# Patient Record
Sex: FEMALE | Race: OTHER | NOT HISPANIC OR LATINO | URBAN - METROPOLITAN AREA
[De-identification: names, ages, dates, MRNs, and addresses within clinical notes are randomized per-mention and may not be internally consistent; named-entity substitution may affect disease eponyms.]

---

## 2019-08-28 ENCOUNTER — INPATIENT (INPATIENT)
Facility: HOSPITAL | Age: 34
LOS: 1 days | Discharge: HOME | DRG: 440 | End: 2019-08-30
Attending: STUDENT IN AN ORGANIZED HEALTH CARE EDUCATION/TRAINING PROGRAM | Admitting: STUDENT IN AN ORGANIZED HEALTH CARE EDUCATION/TRAINING PROGRAM
Payer: SELF-PAY

## 2019-08-28 VITALS
OXYGEN SATURATION: 97 % | HEART RATE: 85 BPM | RESPIRATION RATE: 17 BRPM | TEMPERATURE: 98 F | SYSTOLIC BLOOD PRESSURE: 133 MMHG | DIASTOLIC BLOOD PRESSURE: 89 MMHG | WEIGHT: 134.92 LBS

## 2019-08-28 DIAGNOSIS — Z90.49 ACQUIRED ABSENCE OF OTHER SPECIFIED PARTS OF DIGESTIVE TRACT: Chronic | ICD-10-CM

## 2019-08-28 DIAGNOSIS — Z91.89 OTHER SPECIFIED PERSONAL RISK FACTORS, NOT ELSEWHERE CLASSIFIED: ICD-10-CM

## 2019-08-28 DIAGNOSIS — N28.9 DISORDER OF KIDNEY AND URETER, UNSPECIFIED: ICD-10-CM

## 2019-08-28 DIAGNOSIS — K85.90 ACUTE PANCREATITIS WITHOUT NECROSIS OR INFECTION, UNSPECIFIED: ICD-10-CM

## 2019-08-28 DIAGNOSIS — R63.8 OTHER SYMPTOMS AND SIGNS CONCERNING FOOD AND FLUID INTAKE: ICD-10-CM

## 2019-08-28 DIAGNOSIS — D72.829 ELEVATED WHITE BLOOD CELL COUNT, UNSPECIFIED: ICD-10-CM

## 2019-08-28 LAB
ALBUMIN SERPL ELPH-MCNC: 4.5 G/DL — SIGNIFICANT CHANGE UP (ref 3.3–5)
ALP SERPL-CCNC: 65 U/L — SIGNIFICANT CHANGE UP (ref 40–120)
ALT FLD-CCNC: 48 U/L — HIGH (ref 10–45)
ANION GAP SERPL CALC-SCNC: 9 MMOL/L — SIGNIFICANT CHANGE UP (ref 5–17)
APPEARANCE UR: ABNORMAL
AST SERPL-CCNC: 110 U/L — HIGH (ref 10–40)
BASOPHILS # BLD AUTO: 0.04 K/UL — SIGNIFICANT CHANGE UP (ref 0–0.2)
BASOPHILS NFR BLD AUTO: 0.3 % — SIGNIFICANT CHANGE UP (ref 0–2)
BILIRUB SERPL-MCNC: 0.9 MG/DL — SIGNIFICANT CHANGE UP (ref 0.2–1.2)
BILIRUB UR-MCNC: NEGATIVE — SIGNIFICANT CHANGE UP
BUN SERPL-MCNC: 13 MG/DL — SIGNIFICANT CHANGE UP (ref 7–23)
CALCIUM SERPL-MCNC: 8.9 MG/DL — SIGNIFICANT CHANGE UP (ref 8.4–10.5)
CHLORIDE SERPL-SCNC: 106 MMOL/L — SIGNIFICANT CHANGE UP (ref 96–108)
CO2 SERPL-SCNC: 26 MMOL/L — SIGNIFICANT CHANGE UP (ref 22–31)
COLOR SPEC: YELLOW — SIGNIFICANT CHANGE UP
CREAT SERPL-MCNC: 0.74 MG/DL — SIGNIFICANT CHANGE UP (ref 0.5–1.3)
DIFF PNL FLD: NEGATIVE — SIGNIFICANT CHANGE UP
EOSINOPHIL # BLD AUTO: 0.05 K/UL — SIGNIFICANT CHANGE UP (ref 0–0.5)
EOSINOPHIL NFR BLD AUTO: 0.3 % — SIGNIFICANT CHANGE UP (ref 0–6)
GLUCOSE SERPL-MCNC: 110 MG/DL — HIGH (ref 70–99)
GLUCOSE UR QL: NEGATIVE — SIGNIFICANT CHANGE UP
HCG SERPL-ACNC: <0 MIU/ML — SIGNIFICANT CHANGE UP
HCT VFR BLD CALC: 42.4 % — SIGNIFICANT CHANGE UP (ref 34.5–45)
HGB BLD-MCNC: 13.6 G/DL — SIGNIFICANT CHANGE UP (ref 11.5–15.5)
IMM GRANULOCYTES NFR BLD AUTO: 0.3 % — SIGNIFICANT CHANGE UP (ref 0–1.5)
KETONES UR-MCNC: ABNORMAL MG/DL
LACTATE SERPL-SCNC: 0.9 MMOL/L — SIGNIFICANT CHANGE UP (ref 0.5–2)
LEUKOCYTE ESTERASE UR-ACNC: NEGATIVE — SIGNIFICANT CHANGE UP
LIDOCAIN IGE QN: 271 U/L — HIGH (ref 7–60)
LYMPHOCYTES # BLD AUTO: 1.2 K/UL — SIGNIFICANT CHANGE UP (ref 1–3.3)
LYMPHOCYTES # BLD AUTO: 7.6 % — LOW (ref 13–44)
MCHC RBC-ENTMCNC: 29.6 PG — SIGNIFICANT CHANGE UP (ref 27–34)
MCHC RBC-ENTMCNC: 32.1 GM/DL — SIGNIFICANT CHANGE UP (ref 32–36)
MCV RBC AUTO: 92.4 FL — SIGNIFICANT CHANGE UP (ref 80–100)
MONOCYTES # BLD AUTO: 0.87 K/UL — SIGNIFICANT CHANGE UP (ref 0–0.9)
MONOCYTES NFR BLD AUTO: 5.5 % — SIGNIFICANT CHANGE UP (ref 2–14)
NEUTROPHILS # BLD AUTO: 13.65 K/UL — HIGH (ref 1.8–7.4)
NEUTROPHILS NFR BLD AUTO: 86 % — HIGH (ref 43–77)
NITRITE UR-MCNC: NEGATIVE — SIGNIFICANT CHANGE UP
NRBC # BLD: 0 /100 WBCS — SIGNIFICANT CHANGE UP (ref 0–0)
PH UR: 7.5 — SIGNIFICANT CHANGE UP (ref 5–8)
PLATELET # BLD AUTO: 272 K/UL — SIGNIFICANT CHANGE UP (ref 150–400)
POTASSIUM SERPL-MCNC: 4.2 MMOL/L — SIGNIFICANT CHANGE UP (ref 3.5–5.3)
POTASSIUM SERPL-SCNC: 4.2 MMOL/L — SIGNIFICANT CHANGE UP (ref 3.5–5.3)
PROT SERPL-MCNC: 7 G/DL — SIGNIFICANT CHANGE UP (ref 6–8.3)
PROT UR-MCNC: NEGATIVE MG/DL — SIGNIFICANT CHANGE UP
RBC # BLD: 4.59 M/UL — SIGNIFICANT CHANGE UP (ref 3.8–5.2)
RBC # FLD: 11.7 % — SIGNIFICANT CHANGE UP (ref 10.3–14.5)
SODIUM SERPL-SCNC: 141 MMOL/L — SIGNIFICANT CHANGE UP (ref 135–145)
SP GR SPEC: 1.02 — SIGNIFICANT CHANGE UP (ref 1–1.03)
UROBILINOGEN FLD QL: 0.2 E.U./DL — SIGNIFICANT CHANGE UP
WBC # BLD: 15.86 K/UL — HIGH (ref 3.8–10.5)
WBC # FLD AUTO: 15.86 K/UL — HIGH (ref 3.8–10.5)

## 2019-08-28 PROCEDURE — 74019 RADEX ABDOMEN 2 VIEWS: CPT | Mod: 26

## 2019-08-28 PROCEDURE — 76705 ECHO EXAM OF ABDOMEN: CPT | Mod: 26

## 2019-08-28 PROCEDURE — 99222 1ST HOSP IP/OBS MODERATE 55: CPT | Mod: GC

## 2019-08-28 PROCEDURE — 99285 EMERGENCY DEPT VISIT HI MDM: CPT | Mod: 25

## 2019-08-28 PROCEDURE — 74022 RADEX COMPL AQT ABD SERIES: CPT | Mod: 26

## 2019-08-28 PROCEDURE — 74177 CT ABD & PELVIS W/CONTRAST: CPT | Mod: 26

## 2019-08-28 RX ORDER — MORPHINE SULFATE 50 MG/1
4 CAPSULE, EXTENDED RELEASE ORAL ONCE
Refills: 0 | Status: DISCONTINUED | OUTPATIENT
Start: 2019-08-28 | End: 2019-08-28

## 2019-08-28 RX ORDER — SODIUM CHLORIDE 9 MG/ML
1000 INJECTION INTRAMUSCULAR; INTRAVENOUS; SUBCUTANEOUS ONCE
Refills: 0 | Status: COMPLETED | OUTPATIENT
Start: 2019-08-28 | End: 2019-08-28

## 2019-08-28 RX ORDER — SODIUM CHLORIDE 9 MG/ML
1000 INJECTION INTRAMUSCULAR; INTRAVENOUS; SUBCUTANEOUS
Refills: 0 | Status: DISCONTINUED | OUTPATIENT
Start: 2019-08-28 | End: 2019-08-28

## 2019-08-28 RX ORDER — CEFTRIAXONE 500 MG/1
1000 INJECTION, POWDER, FOR SOLUTION INTRAMUSCULAR; INTRAVENOUS ONCE
Refills: 0 | Status: COMPLETED | OUTPATIENT
Start: 2019-08-28 | End: 2019-08-28

## 2019-08-28 RX ORDER — OXYCODONE HYDROCHLORIDE 5 MG/1
5 TABLET ORAL EVERY 6 HOURS
Refills: 0 | Status: DISCONTINUED | OUTPATIENT
Start: 2019-08-28 | End: 2019-08-29

## 2019-08-28 RX ORDER — IOHEXOL 300 MG/ML
30 INJECTION, SOLUTION INTRAVENOUS ONCE
Refills: 0 | Status: COMPLETED | OUTPATIENT
Start: 2019-08-28 | End: 2019-08-28

## 2019-08-28 RX ORDER — HYDROMORPHONE HYDROCHLORIDE 2 MG/ML
0.5 INJECTION INTRAMUSCULAR; INTRAVENOUS; SUBCUTANEOUS EVERY 4 HOURS
Refills: 0 | Status: DISCONTINUED | OUTPATIENT
Start: 2019-08-28 | End: 2019-08-30

## 2019-08-28 RX ORDER — SODIUM CHLORIDE 9 MG/ML
1000 INJECTION, SOLUTION INTRAVENOUS
Refills: 0 | Status: DISCONTINUED | OUTPATIENT
Start: 2019-08-28 | End: 2019-08-30

## 2019-08-28 RX ORDER — METRONIDAZOLE 500 MG
500 TABLET ORAL ONCE
Refills: 0 | Status: COMPLETED | OUTPATIENT
Start: 2019-08-28 | End: 2019-08-28

## 2019-08-28 RX ORDER — CYCLOBENZAPRINE HYDROCHLORIDE 10 MG/1
10 TABLET, FILM COATED ORAL ONCE
Refills: 0 | Status: DISCONTINUED | OUTPATIENT
Start: 2019-08-28 | End: 2019-08-28

## 2019-08-28 RX ORDER — KETOROLAC TROMETHAMINE 30 MG/ML
30 SYRINGE (ML) INJECTION ONCE
Refills: 0 | Status: DISCONTINUED | OUTPATIENT
Start: 2019-08-28 | End: 2019-08-28

## 2019-08-28 RX ADMIN — SODIUM CHLORIDE 150 MILLILITER(S): 9 INJECTION INTRAMUSCULAR; INTRAVENOUS; SUBCUTANEOUS at 20:20

## 2019-08-28 RX ADMIN — Medication 100 MILLIGRAM(S): at 20:19

## 2019-08-28 RX ADMIN — MORPHINE SULFATE 4 MILLIGRAM(S): 50 CAPSULE, EXTENDED RELEASE ORAL at 18:07

## 2019-08-28 RX ADMIN — MORPHINE SULFATE 4 MILLIGRAM(S): 50 CAPSULE, EXTENDED RELEASE ORAL at 21:40

## 2019-08-28 RX ADMIN — IOHEXOL 30 MILLILITER(S): 300 INJECTION, SOLUTION INTRAVENOUS at 17:49

## 2019-08-28 RX ADMIN — SODIUM CHLORIDE 200 MILLILITER(S): 9 INJECTION, SOLUTION INTRAVENOUS at 23:54

## 2019-08-28 RX ADMIN — MORPHINE SULFATE 4 MILLIGRAM(S): 50 CAPSULE, EXTENDED RELEASE ORAL at 20:38

## 2019-08-28 RX ADMIN — SODIUM CHLORIDE 150 MILLILITER(S): 9 INJECTION INTRAMUSCULAR; INTRAVENOUS; SUBCUTANEOUS at 23:33

## 2019-08-28 RX ADMIN — MORPHINE SULFATE 4 MILLIGRAM(S): 50 CAPSULE, EXTENDED RELEASE ORAL at 17:49

## 2019-08-28 RX ADMIN — HYDROMORPHONE HYDROCHLORIDE 0.5 MILLIGRAM(S): 2 INJECTION INTRAMUSCULAR; INTRAVENOUS; SUBCUTANEOUS at 23:53

## 2019-08-28 RX ADMIN — CEFTRIAXONE 100 MILLIGRAM(S): 500 INJECTION, POWDER, FOR SOLUTION INTRAMUSCULAR; INTRAVENOUS at 19:26

## 2019-08-28 RX ADMIN — SODIUM CHLORIDE 1000 MILLILITER(S): 9 INJECTION INTRAMUSCULAR; INTRAVENOUS; SUBCUTANEOUS at 17:49

## 2019-08-28 RX ADMIN — HYDROMORPHONE HYDROCHLORIDE 0.5 MILLIGRAM(S): 2 INJECTION INTRAMUSCULAR; INTRAVENOUS; SUBCUTANEOUS at 23:32

## 2019-08-28 RX ADMIN — OXYCODONE HYDROCHLORIDE 5 MILLIGRAM(S): 5 TABLET ORAL at 23:33

## 2019-08-28 NOTE — ED ADULT TRIAGE NOTE - CHIEF COMPLAINT QUOTE
c/o abdominal pain radiating to back that started today while at work.  Denies falls / injury, cough, recent travels, n/v/d.  EKG done

## 2019-08-28 NOTE — H&P ADULT - PROBLEM SELECTOR PLAN 3
Pt w/ hx of kidney lesion. She states it was approx 1cm in the past. Now measuring 2cm on CT. May be angiomyolipoma vs. neoplasm; recommending MRI to confirm  - Will f/u w/ outpatient MRI Pt w/ constipation and possible fecal impaction on CT  - Will start miralax qd

## 2019-08-28 NOTE — H&P ADULT - NSHPPHYSICALEXAM_GEN_ALL_CORE
.  VITAL SIGNS:  T(C): 36.8 (08-28-19 @ 20:32), Max: 36.8 (08-28-19 @ 20:32)  T(F): 98.2 (08-28-19 @ 20:32), Max: 98.2 (08-28-19 @ 20:32)  HR: 91 (08-28-19 @ 20:32) (81 - 91)  BP: 116/76 (08-28-19 @ 20:32) (109/72 - 133/89)  BP(mean): --  RR: 16 (08-28-19 @ 20:32) (16 - 17)  SpO2: 97% (08-28-19 @ 20:32) (97% - 100%)  Wt(kg): --    PHYSICAL EXAM:    Constitutional: WDWN resting comfortably in bed; NAD  Head: NC/AT  Eyes: PERRL, EOMI, anicteric sclera  ENT: no nasal discharge; uvula midline, no oropharyngeal erythema or exudates; MMM  Neck: supple; no JVD or thyromegaly  Respiratory: CTA B/L; no W/R/R, no retractions  Cardiac: +S1/S2; RRR; no M/R/G; PMI non-displaced  Gastrointestinal: soft, NT/ND; no rebound or guarding; +BSx4  Genitourinary: normal external genitalia  Back: spine midline, no bony tenderness or step-offs; no CVAT B/L  Extremities: WWP, no clubbing or cyanosis; no peripheral edema. Capillary refill <2 sec  Musculoskeletal: NROM x4; no joint swelling, tenderness or erythema  Vascular: 2+ radial, femoral, DP/PT pulses B/L  Dermatologic: skin warm, dry and intact; no rashes, wounds, or scars  Lymphatic: no submandibular or cervical LAD  Neurologic: AAOx3; CNII-XII grossly intact; no focal deficits  Psychiatric: affect and characteristics of appearance, verbalizations, behaviors are appropriate .  VITAL SIGNS:  T(C): 36.8 (08-28-19 @ 20:32), Max: 36.8 (08-28-19 @ 20:32)  T(F): 98.2 (08-28-19 @ 20:32), Max: 98.2 (08-28-19 @ 20:32)  HR: 91 (08-28-19 @ 20:32) (81 - 91)  BP: 116/76 (08-28-19 @ 20:32) (109/72 - 133/89)  BP(mean): --  RR: 16 (08-28-19 @ 20:32) (16 - 17)  SpO2: 97% (08-28-19 @ 20:32) (97% - 100%)  Wt(kg): --    PHYSICAL EXAM:    Constitutional: WDWN resting comfortably in bed; NAD  Head: NC/AT  Eyes: PERRL, EOMI, anicteric sclera  ENT: no nasal discharge; uvula midline, no oropharyngeal erythema or exudates; MMM  Neck: supple; no JVD or thyromegaly  Respiratory: CTA B/L; no W/R/R, no retractions  Cardiac: +S1/S2; RRR; no M/R/G; PMI non-displaced  Gastrointestinal: +scarring from previous lap all, +stretch marks, soft, mildly distended; tender to deep palpation in midepigastrium, no rebound or guarding; +BSx4  Genitourinary: normal external genitalia  Back: spine midline, no bony tenderness or step-offs; no CVAT B/L  Extremities: WWP, no clubbing or cyanosis; no peripheral edema. Capillary refill <2 sec  Musculoskeletal: NROM x4; no joint swelling, tenderness or erythema  Vascular: 2+ radial, femoral, DP/PT pulses B/L  Dermatologic: skin warm, dry and intact; no rashes, wounds, or scars  Lymphatic: no submandibular or cervical LAD  Neurologic: AAOx3; CNII-XII grossly intact; no focal deficits  Psychiatric: affect and characteristics of appearance, verbalizations, behaviors are appropriate

## 2019-08-28 NOTE — H&P ADULT - PROBLEM SELECTOR PLAN 2
Pt w/ leukocytosis. Possibly in the setting of non-obstructing stone. No other source of infection noted at this time  - C/w ceftriaxone flagyl for now Pt w/ leukocytosis. Likely reactive in the setting of acute inflammation. No other source of infection noted at this time  - Will hold off on abx for now  - If patient becomes febrile or unstable, will c/w abx for concern for cholangitis

## 2019-08-28 NOTE — ED PROVIDER NOTE - CLINICAL SUMMARY MEDICAL DECISION MAKING FREE TEXT BOX
32 y/o f presents c/o mid abd pain radiating to back; afebrile, vss, exam with epigastric tenderness.  Labs with wbc 15.6, elevated AST and ALT, elevated lipase 297.  Pt has pancreatitis by laboratory values, has hx cholecystectomy, suspect retained stone, has no other obvious cause for pancreatitis.  Will get CT a/p and u/s RUQ to eval for dilated cbd, retained stone.  Pt given IV fluid, pain meds, will f/u imaging and admit.

## 2019-08-28 NOTE — ED PROVIDER NOTE - ATTENDING CONTRIBUTION TO CARE
32 yo F with acute pancreatitis -? related to possible sludge vs gallstones- prior lap choly- will admit to medicine- hydration - will need MRCP/ ERCP

## 2019-08-28 NOTE — H&P ADULT - PROBLEM SELECTOR PLAN 4
F: LR at 200cc/hr  E: replete K<4, Mg<2  N: Low fat mechanical soft    VTE Prophylaxis: Lovenox SubQ  C: Full Code  D: RMF F: LR at 200cc/hr  E: replete K<4, Mg<2  N: Low fat mechanical soft    VTE Prophylaxis: IMPROVE 0; SCDs  C: Full Code  D: RMF Pt w/ hx of kidney lesion. She states it was approx 1cm in the past. Now measuring 2cm on CT. May be angiomyolipoma vs. neoplasm; recommending MRI to confirm  - Will f/u w/ outpatient MRI

## 2019-08-28 NOTE — H&P ADULT - PROBLEM SELECTOR PLAN 6
1) PCP Contacted on Admission: (Y/N) --> Name & Phone #: Dr. Dahl in [facility]  2) Date of Contact with PCP:  3) PCP Contacted at Discharge: (Y/N)  4) Summary of Handoff Given to PCP:   5) Post-Discharge Appointment Date and Location:

## 2019-08-28 NOTE — H&P ADULT - NSHPLABSRESULTS_GEN_ALL_CORE
.  LABS:                         13.6   15.86 )-----------( 272      ( 28 Aug 2019 17:04 )             42.4         141  |  106  |  13  ----------------------------<  110<H>  4.2   |  26  |  0.74    Ca    8.9      28 Aug 2019 17:04    TPro  7.0  /  Alb  4.5  /  TBili  0.9  /  DBili  x   /  AST  110<H>  /  ALT  48<H>  /  AlkPhos  65        Urinalysis Basic - ( 28 Aug 2019 17:42 )    Color: Yellow / Appearance: Cloudy / S.020 / pH: x  Gluc: x / Ketone: Trace mg/dL  / Bili: Negative / Urobili: 0.2 E.U./dL   Blood: x / Protein: NEGATIVE mg/dL / Nitrite: NEGATIVE   Leuk Esterase: NEGATIVE / RBC: x / WBC x   Sq Epi: x / Non Sq Epi: x / Bacteria: x            Lactate, Blood: 0.9 mmoL/L ( @ 19:01)      RADIOLOGY, EKG & ADDITIONAL TESTS: Reviewed.

## 2019-08-28 NOTE — ED PROVIDER NOTE - OBJECTIVE STATEMENT
34 y/o f no pmh presents stating having pain in mid upper abdomen which radiates to her back.  Pt stating she is unsure if she has back pain which is causing her abdominal pain or vice versa.  Pt reports having pain in mid back last week, seen at [facility], given anti inflammatory medication and muscle relaxant with minima improvement and discharged.  Pt denies nausea, vomiting, fever, chills, dysuria, CP, SOB, all other ROS negative.

## 2019-08-28 NOTE — H&P ADULT - ASSESSMENT
34 y/o F w/ hx of cholecystectomy presenting w/ complaints of back and abdominal pain for several hours admitted for acute pancreatitis

## 2019-08-28 NOTE — ED ADULT NURSE NOTE - OBJECTIVE STATEMENT
pt states around 1600 today she was at her desk at work when she suddenly developed severe middle back pain that radiated around to the upper abdomen.  pt states abdominal pain was mostly epigastric and LUQ.  pt denies n/v.  pt denies fevers/chills.  pt states pain was severe and associated with diaphoresis. pt reports rare use of alcohol.  States last intake was 1-2 drinks 3 weeks ago.

## 2019-08-28 NOTE — H&P ADULT - PROBLEM SELECTOR PLAN 5
1) PCP Contacted on Admission: (Y/N) --> Name & Phone #: Dr. Dahl in [facility]  2) Date of Contact with PCP:  3) PCP Contacted at Discharge: (Y/N)  4) Summary of Handoff Given to PCP:   5) Post-Discharge Appointment Date and Location: F: LR at 200cc/hr  E: replete K<4, Mg<2  N: Low fat mechanical soft    VTE Prophylaxis: IMPROVE 0; SCDs  C: Full Code  D: RMF

## 2019-08-28 NOTE — H&P ADULT - NSHPSOCIALHISTORY_GEN_ALL_CORE
Works as . Denies smoking, drinks socially (last drink 3 weeks ago; had 2 drinks that night) and denies illicit drug use.

## 2019-08-28 NOTE — ED PROVIDER NOTE - DIAGNOSTIC INTERPRETATION
abd series- no air fluid levels, no pneumoperitoneum, no acute infiltrate in lung fields, no bony abnormalities

## 2019-08-28 NOTE — H&P ADULT - NSICDXFAMILYHX_GEN_ALL_CORE_FT
FAMILY HISTORY:  FH: asthma  FH: breast cancer  FH: diabetes mellitus  FH: HTN (hypertension)  FH: hyperlipidemia

## 2019-08-28 NOTE — H&P ADULT - HISTORY OF PRESENT ILLNESS
34 y/o F w/ hx of cholecystectomy presenting w/ complaints of back and abdominal pain for several hours. The pain is 6/10, midepigastric w/ radiation to the back, better with leaning forward and worse with laying back for prolonged periods of time. Its onset was sudden and a/w chills, diaphoresis, paleness and initial difficulty breathing. She states that the pain began while she was at work. She ate her lunch around noon which was homemade spaghetti and then starting around 4/4:30pm, she suddenly began experiencing the pain when she got up to stretch. It was witnessed by a coworker who called EMS. She was placed on 2L NC and transferred to the ED. The patient states that this pain is similar in nature to the pain she experienced prior to her cholecystectomy. She previously had an unintentional 10 lb weight loss over the past couple of months however she has been gaining some of that weight back. She denies recent alcohol use (last drink was 3 weeks ago when she had 2 cups of mixed liquor beverage), takes no medications or herbal supplements, denies recreational drug use. She has no sick contacts or recent travel. She denies fever, HA, vision changes, current CP/SOB, N/V/D, dysuria, hematuria, LE edema. Her last BM was approximately 2 days ago which is her normal. Of note, patient recently went to [facility] last week for back pain where she was given a shot of toradol and sent home on flexeril for concern for sciatica.     In the ED, vitals T-97.7, HR-85, BP-133/89, RR-17, SpO2-97% on RA. Labs pertinent for WBC 15.86, AST//48, normal bili and lipase 271. CTAP showed minimal biliary ductal dilation, possible rectal fecal impaction, and heterogenous lesion in R kidney (angiomyolipoma vs. neoplasm). US also w/ ductal dilation. Patient received ceftriaxone 1g, flagyl 500mg, morphine 8mg IV, 1L NS and was started on NS at 150cc/hr.

## 2019-08-29 DIAGNOSIS — D72.829 ELEVATED WHITE BLOOD CELL COUNT, UNSPECIFIED: ICD-10-CM

## 2019-08-29 DIAGNOSIS — R94.5 ABNORMAL RESULTS OF LIVER FUNCTION STUDIES: ICD-10-CM

## 2019-08-29 DIAGNOSIS — K85.90 ACUTE PANCREATITIS WITHOUT NECROSIS OR INFECTION, UNSPECIFIED: ICD-10-CM

## 2019-08-29 DIAGNOSIS — K59.00 CONSTIPATION, UNSPECIFIED: ICD-10-CM

## 2019-08-29 LAB
ALBUMIN SERPL ELPH-MCNC: 3.8 G/DL — SIGNIFICANT CHANGE UP (ref 3.3–5)
ALBUMIN SERPL ELPH-MCNC: 3.9 G/DL — SIGNIFICANT CHANGE UP (ref 3.3–5)
ALP SERPL-CCNC: 143 U/L — HIGH (ref 40–120)
ALP SERPL-CCNC: 150 U/L — HIGH (ref 40–120)
ALT FLD-CCNC: 786 U/L — HIGH (ref 10–45)
ALT FLD-CCNC: 988 U/L — HIGH (ref 10–45)
ANION GAP SERPL CALC-SCNC: 7 MMOL/L — SIGNIFICANT CHANGE UP (ref 5–17)
ANION GAP SERPL CALC-SCNC: 9 MMOL/L — SIGNIFICANT CHANGE UP (ref 5–17)
APTT BLD: 30.4 SEC — SIGNIFICANT CHANGE UP (ref 27.5–36.3)
APTT BLD: 31.5 SEC — SIGNIFICANT CHANGE UP (ref 27.5–36.3)
AST SERPL-CCNC: 1370 U/L — HIGH (ref 10–40)
AST SERPL-CCNC: 851 U/L — HIGH (ref 10–40)
BASOPHILS # BLD AUTO: 0.02 K/UL — SIGNIFICANT CHANGE UP (ref 0–0.2)
BASOPHILS NFR BLD AUTO: 0.3 % — SIGNIFICANT CHANGE UP (ref 0–2)
BILIRUB SERPL-MCNC: 2.1 MG/DL — HIGH (ref 0.2–1.2)
BILIRUB SERPL-MCNC: 2.4 MG/DL — HIGH (ref 0.2–1.2)
BLD GP AB SCN SERPL QL: NEGATIVE — SIGNIFICANT CHANGE UP
BUN SERPL-MCNC: 6 MG/DL — LOW (ref 7–23)
BUN SERPL-MCNC: 6 MG/DL — LOW (ref 7–23)
CALCIUM SERPL-MCNC: 8.4 MG/DL — SIGNIFICANT CHANGE UP (ref 8.4–10.5)
CALCIUM SERPL-MCNC: 8.5 MG/DL — SIGNIFICANT CHANGE UP (ref 8.4–10.5)
CHLORIDE SERPL-SCNC: 103 MMOL/L — SIGNIFICANT CHANGE UP (ref 96–108)
CHLORIDE SERPL-SCNC: 106 MMOL/L — SIGNIFICANT CHANGE UP (ref 96–108)
CHOLEST SERPL-MCNC: 166 MG/DL — SIGNIFICANT CHANGE UP (ref 10–199)
CO2 SERPL-SCNC: 22 MMOL/L — SIGNIFICANT CHANGE UP (ref 22–31)
CO2 SERPL-SCNC: 25 MMOL/L — SIGNIFICANT CHANGE UP (ref 22–31)
CREAT SERPL-MCNC: 0.6 MG/DL — SIGNIFICANT CHANGE UP (ref 0.5–1.3)
CREAT SERPL-MCNC: 0.65 MG/DL — SIGNIFICANT CHANGE UP (ref 0.5–1.3)
EOSINOPHIL # BLD AUTO: 0.04 K/UL — SIGNIFICANT CHANGE UP (ref 0–0.5)
EOSINOPHIL NFR BLD AUTO: 0.6 % — SIGNIFICANT CHANGE UP (ref 0–6)
GLUCOSE SERPL-MCNC: 111 MG/DL — HIGH (ref 70–99)
GLUCOSE SERPL-MCNC: 111 MG/DL — HIGH (ref 70–99)
HCT VFR BLD CALC: 36.3 % — SIGNIFICANT CHANGE UP (ref 34.5–45)
HCT VFR BLD CALC: 37 % — SIGNIFICANT CHANGE UP (ref 34.5–45)
HDLC SERPL-MCNC: 45 MG/DL — LOW
HGB BLD-MCNC: 11.9 G/DL — SIGNIFICANT CHANGE UP (ref 11.5–15.5)
HGB BLD-MCNC: 11.9 G/DL — SIGNIFICANT CHANGE UP (ref 11.5–15.5)
IMM GRANULOCYTES NFR BLD AUTO: 0.3 % — SIGNIFICANT CHANGE UP (ref 0–1.5)
INR BLD: 1.1 — SIGNIFICANT CHANGE UP (ref 0.88–1.16)
INR BLD: 1.11 — SIGNIFICANT CHANGE UP (ref 0.88–1.16)
LIPID PNL WITH DIRECT LDL SERPL: 91 MG/DL — SIGNIFICANT CHANGE UP
LYMPHOCYTES # BLD AUTO: 0.68 K/UL — LOW (ref 1–3.3)
LYMPHOCYTES # BLD AUTO: 10.8 % — LOW (ref 13–44)
MAGNESIUM SERPL-MCNC: 1.9 MG/DL — SIGNIFICANT CHANGE UP (ref 1.6–2.6)
MCHC RBC-ENTMCNC: 29.6 PG — SIGNIFICANT CHANGE UP (ref 27–34)
MCHC RBC-ENTMCNC: 30 PG — SIGNIFICANT CHANGE UP (ref 27–34)
MCHC RBC-ENTMCNC: 32.2 GM/DL — SIGNIFICANT CHANGE UP (ref 32–36)
MCHC RBC-ENTMCNC: 32.8 GM/DL — SIGNIFICANT CHANGE UP (ref 32–36)
MCV RBC AUTO: 91.4 FL — SIGNIFICANT CHANGE UP (ref 80–100)
MCV RBC AUTO: 92 FL — SIGNIFICANT CHANGE UP (ref 80–100)
MONOCYTES # BLD AUTO: 0.6 K/UL — SIGNIFICANT CHANGE UP (ref 0–0.9)
MONOCYTES NFR BLD AUTO: 9.6 % — SIGNIFICANT CHANGE UP (ref 2–14)
NEUTROPHILS # BLD AUTO: 4.91 K/UL — SIGNIFICANT CHANGE UP (ref 1.8–7.4)
NEUTROPHILS NFR BLD AUTO: 78.4 % — HIGH (ref 43–77)
NRBC # BLD: 0 /100 WBCS — SIGNIFICANT CHANGE UP (ref 0–0)
NRBC # BLD: 0 /100 WBCS — SIGNIFICANT CHANGE UP (ref 0–0)
PHOSPHATE SERPL-MCNC: 3.7 MG/DL — SIGNIFICANT CHANGE UP (ref 2.5–4.5)
PLATELET # BLD AUTO: 239 K/UL — SIGNIFICANT CHANGE UP (ref 150–400)
PLATELET # BLD AUTO: 240 K/UL — SIGNIFICANT CHANGE UP (ref 150–400)
POTASSIUM SERPL-MCNC: 3.9 MMOL/L — SIGNIFICANT CHANGE UP (ref 3.5–5.3)
POTASSIUM SERPL-MCNC: 4.1 MMOL/L — SIGNIFICANT CHANGE UP (ref 3.5–5.3)
POTASSIUM SERPL-SCNC: 3.9 MMOL/L — SIGNIFICANT CHANGE UP (ref 3.5–5.3)
POTASSIUM SERPL-SCNC: 4.1 MMOL/L — SIGNIFICANT CHANGE UP (ref 3.5–5.3)
PROT SERPL-MCNC: 6.1 G/DL — SIGNIFICANT CHANGE UP (ref 6–8.3)
PROT SERPL-MCNC: 6.4 G/DL — SIGNIFICANT CHANGE UP (ref 6–8.3)
PROTHROM AB SERPL-ACNC: 12.5 SEC — SIGNIFICANT CHANGE UP (ref 10–12.9)
PROTHROM AB SERPL-ACNC: 12.6 SEC — SIGNIFICANT CHANGE UP (ref 10–12.9)
RBC # BLD: 3.97 M/UL — SIGNIFICANT CHANGE UP (ref 3.8–5.2)
RBC # BLD: 4.02 M/UL — SIGNIFICANT CHANGE UP (ref 3.8–5.2)
RBC # FLD: 11.5 % — SIGNIFICANT CHANGE UP (ref 10.3–14.5)
RBC # FLD: 11.6 % — SIGNIFICANT CHANGE UP (ref 10.3–14.5)
RH IG SCN BLD-IMP: POSITIVE — SIGNIFICANT CHANGE UP
SODIUM SERPL-SCNC: 134 MMOL/L — LOW (ref 135–145)
SODIUM SERPL-SCNC: 138 MMOL/L — SIGNIFICANT CHANGE UP (ref 135–145)
TOTAL CHOLESTEROL/HDL RATIO MEASUREMENT: 3.7 RATIO — SIGNIFICANT CHANGE UP (ref 3.3–7.1)
TRIGL SERPL-MCNC: 151 MG/DL — HIGH (ref 10–149)
WBC # BLD: 5.25 K/UL — SIGNIFICANT CHANGE UP (ref 3.8–10.5)
WBC # BLD: 6.27 K/UL — SIGNIFICANT CHANGE UP (ref 3.8–10.5)
WBC # FLD AUTO: 5.25 K/UL — SIGNIFICANT CHANGE UP (ref 3.8–10.5)
WBC # FLD AUTO: 6.27 K/UL — SIGNIFICANT CHANGE UP (ref 3.8–10.5)

## 2019-08-29 PROCEDURE — 43262 ENDO CHOLANGIOPANCREATOGRAPH: CPT

## 2019-08-29 PROCEDURE — 43264 ERCP REMOVE DUCT CALCULI: CPT

## 2019-08-29 PROCEDURE — 99233 SBSQ HOSP IP/OBS HIGH 50: CPT | Mod: GC

## 2019-08-29 RX ORDER — ONDANSETRON 8 MG/1
4 TABLET, FILM COATED ORAL EVERY 6 HOURS
Refills: 0 | Status: DISCONTINUED | OUTPATIENT
Start: 2019-08-29 | End: 2019-08-30

## 2019-08-29 RX ORDER — MAGNESIUM SULFATE 500 MG/ML
1 VIAL (ML) INJECTION ONCE
Refills: 0 | Status: COMPLETED | OUTPATIENT
Start: 2019-08-29 | End: 2019-08-29

## 2019-08-29 RX ORDER — OXYCODONE HYDROCHLORIDE 5 MG/1
5 TABLET ORAL EVERY 6 HOURS
Refills: 0 | Status: DISCONTINUED | OUTPATIENT
Start: 2019-08-29 | End: 2019-08-30

## 2019-08-29 RX ORDER — CEFTRIAXONE 500 MG/1
1000 INJECTION, POWDER, FOR SOLUTION INTRAMUSCULAR; INTRAVENOUS EVERY 24 HOURS
Refills: 0 | Status: DISCONTINUED | OUTPATIENT
Start: 2019-08-29 | End: 2019-08-30

## 2019-08-29 RX ORDER — METRONIDAZOLE 500 MG
500 TABLET ORAL EVERY 8 HOURS
Refills: 0 | Status: DISCONTINUED | OUTPATIENT
Start: 2019-08-29 | End: 2019-08-30

## 2019-08-29 RX ORDER — METRONIDAZOLE 500 MG
500 TABLET ORAL EVERY 8 HOURS
Refills: 0 | Status: DISCONTINUED | OUTPATIENT
Start: 2019-08-29 | End: 2019-08-29

## 2019-08-29 RX ORDER — CEFTRIAXONE 500 MG/1
1000 INJECTION, POWDER, FOR SOLUTION INTRAMUSCULAR; INTRAVENOUS EVERY 24 HOURS
Refills: 0 | Status: DISCONTINUED | OUTPATIENT
Start: 2019-08-29 | End: 2019-08-29

## 2019-08-29 RX ORDER — POLYETHYLENE GLYCOL 3350 17 G/17G
17 POWDER, FOR SOLUTION ORAL AT BEDTIME
Refills: 0 | Status: DISCONTINUED | OUTPATIENT
Start: 2019-08-29 | End: 2019-08-30

## 2019-08-29 RX ADMIN — CEFTRIAXONE 100 MILLIGRAM(S): 500 INJECTION, POWDER, FOR SOLUTION INTRAMUSCULAR; INTRAVENOUS at 19:37

## 2019-08-29 RX ADMIN — OXYCODONE HYDROCHLORIDE 5 MILLIGRAM(S): 5 TABLET ORAL at 22:14

## 2019-08-29 RX ADMIN — OXYCODONE HYDROCHLORIDE 5 MILLIGRAM(S): 5 TABLET ORAL at 22:48

## 2019-08-29 RX ADMIN — OXYCODONE HYDROCHLORIDE 5 MILLIGRAM(S): 5 TABLET ORAL at 09:09

## 2019-08-29 RX ADMIN — OXYCODONE HYDROCHLORIDE 5 MILLIGRAM(S): 5 TABLET ORAL at 00:35

## 2019-08-29 RX ADMIN — Medication 100 GRAM(S): at 09:00

## 2019-08-29 RX ADMIN — POLYETHYLENE GLYCOL 3350 17 GRAM(S): 17 POWDER, FOR SOLUTION ORAL at 22:14

## 2019-08-29 RX ADMIN — OXYCODONE HYDROCHLORIDE 5 MILLIGRAM(S): 5 TABLET ORAL at 15:56

## 2019-08-29 RX ADMIN — Medication 500 MILLIGRAM(S): at 10:16

## 2019-08-29 RX ADMIN — POLYETHYLENE GLYCOL 3350 17 GRAM(S): 17 POWDER, FOR SOLUTION ORAL at 00:35

## 2019-08-29 RX ADMIN — Medication 500 MILLIGRAM(S): at 19:37

## 2019-08-29 RX ADMIN — SODIUM CHLORIDE 100 MILLILITER(S): 9 INJECTION, SOLUTION INTRAVENOUS at 22:15

## 2019-08-29 NOTE — PROGRESS NOTE ADULT - PROBLEM SELECTOR PLAN 1
possibly d/t gallstones (elevated LFTs, biliary ductal dilation on imaging, although Pt. s/p cholecystectomy); appreciate GI recs, plan for ERCP; cont. IVF, pain control, NPO, serial abdominal exams

## 2019-08-29 NOTE — PROGRESS NOTE ADULT - PROBLEM SELECTOR PLAN 3
Pt w/ constipation and possible fecal impaction on CT  - Will start miralax qd Pt w/ leukocytosis. Likely reactive in the setting of acute inflammation. No other source of infection noted at this time  - Patient received one dose of ceftriaxone and flagyl in the ED, will continue as per GI  - If patient becomes febrile or unstable, consider cholangitis  - WBC 5.25 today  - resolved

## 2019-08-29 NOTE — PROGRESS NOTE ADULT - PROBLEM SELECTOR PLAN 5
F: LR at 200cc/hr  E: replete K<4, Mg<2  N: Low fat mechanical soft    VTE Prophylaxis: IMPROVE 0; SCDs  C: Full Code  D: RMF F: LR at 200cc/hr  E: replete K<4, Mg<2  N: Low fat mechanical soft, NPO for ERCP    VTE Prophylaxis: IMPROVE 0; SCDs  C: Full Code  D: RMF Pt w/ hx of kidney lesion. She states it was approx 1cm in the past. Now measuring 2cm on CT. May be angiomyolipoma vs. neoplasm; recommending MRI to confirm  - Will f/u with outpatient MRI

## 2019-08-29 NOTE — PROGRESS NOTE ADULT - SUBJECTIVE AND OBJECTIVE BOX
VITAL SIGNS:  Vital Signs Last 24 Hrs  T(C): 36.6 (29 Aug 2019 10:40), Max: 36.8 (28 Aug 2019 20:32)  T(F): 97.9 (29 Aug 2019 10:40), Max: 98.3 (29 Aug 2019 07:27)  HR: 79 (29 Aug 2019 10:40) (79 - 99)  BP: 100/67 (29 Aug 2019 10:40) (100/67 - 133/89)  RR: 16 (29 Aug 2019 10:40) (16 - 18)  SpO2: 97% (29 Aug 2019 10:40) (97% - 100%)    PHYSICAL EXAM:    General: NAD, sleeping comfortably in bed  HEENT: NC/AT; PERRL, anicteric sclera; MMM  Neck: supple, no JVD  Cardiovascular: +S1/S2, RRR, no M/R/G  Respiratory: clear to auscultation B/L; no W/R/R  Gastrointestinal: soft, mildly tender in the mid-epigastric region; +BSx4  Dermatological: chronic skin change on patient's right upper back, in the same area where patient localizes her pain  Extremities: WWP; no edema, clubbing or cyanosis  Vascular: 2+ radial, DP/PT pulses B/L  Neurological: AAOx3; no focal deficits    MEDICATIONS:  MEDICATIONS  (STANDING):  cefTRIAXone   IVPB 1000 milliGRAM(s) IV Intermittent every 24 hours  lactated ringers. 1000 milliLiter(s) (200 mL/Hr) IV Continuous <Continuous>  metroNIDAZOLE    Tablet 500 milliGRAM(s) Oral every 8 hours  polyethylene glycol 3350 17 Gram(s) Oral at bedtime    MEDICATIONS  (PRN):  HYDROmorphone  Injectable 0.5 milliGRAM(s) IV Push every 4 hours PRN Severe Pain (7 - 10)  ondansetron Injectable 4 milliGRAM(s) IV Push every 6 hours PRN Nausea and/or Vomiting  oxyCODONE    IR 5 milliGRAM(s) Oral every 6 hours PRN Moderate Pain (4 - 6)      ALLERGIES:  Allergies    No Known Allergies    Intolerances        LABS:                        11.9   5.25  )-----------( 240      ( 29 Aug 2019 10:57 )             36.3     08-    138  |  106  |  6<L>  ----------------------------<  111<H>  3.9   |  25  |  0.65    Ca    8.4      29 Aug 2019 10:57  Phos  3.7       Mg     1.9         TPro  6.1  /  Alb  3.9  /  TBili  2.4<H>  /  DBili  x   /  AST  851<H>  /  ALT  786<H>  /  AlkPhos  150<H>      PT/INR - ( 29 Aug 2019 10:57 )   PT: 12.6 sec;   INR: 1.11          PTT - ( 29 Aug 2019 10:57 )  PTT:31.5 sec  Urinalysis Basic - ( 28 Aug 2019 17:42 )    Color: Yellow / Appearance: Cloudy / S.020 / pH: x  Gluc: x / Ketone: Trace mg/dL  / Bili: Negative / Urobili: 0.2 E.U./dL   Blood: x / Protein: NEGATIVE mg/dL / Nitrite: NEGATIVE   Leuk Esterase: NEGATIVE / RBC: x / WBC x   Sq Epi: x / Non Sq Epi: x / Bacteria: x      CAPILLARY BLOOD GLUCOSE          RADIOLOGY & ADDITIONAL TESTS:  < from: US Abdomen Limited (19 @ 20:00) >  IMPRESSION:  Minimal intrahepatic biliary ductal dilatation, as seen on CT. No   choledocholithiasis visualized.    < end of copied text >    < from: CT Abdomen and Pelvis w/ Oral Cont and w/ IV Cont (19 @ 20:13) >  IMPRESSION:  1.  Minimal biliary ductal dilatation, within normal limits for for the   post cholecystectomy state. Correlate with serum bilirubin, consider MRCP  if there is persistent clinical concern for obstruction.  2.  Possible rectal fecal impaction.  3.  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 Recommend   follow-up and correlation with MRI.    < end of copied text >

## 2019-08-29 NOTE — PROGRESS NOTE ADULT - PROBLEM SELECTOR PLAN 4
Pt w/ hx of kidney lesion. She states it was approx 1cm in the past. Now measuring 2cm on CT. May be angiomyolipoma vs. neoplasm; recommending MRI to confirm  - Will f/u w/ outpatient MRI Pt w/ hx of kidney lesion. She states it was approx 1cm in the past. Now measuring 2cm on CT. May be angiomyolipoma vs. neoplasm; recommending MRI to confirm  - Will f/u with outpatient MRI Pt w/ constipation and possible fecal impaction on CT  - Will start miralax qd

## 2019-08-29 NOTE — PROGRESS NOTE ADULT - PROBLEM SELECTOR PLAN 7
1) PCP Contacted on Admission: (Y/N) --> Name & Phone #: Dr. Dahl in [facility]  2) Date of Contact with PCP:  3) PCP Contacted at Discharge: (Y/N)  4) Summary of Handoff Given to PCP:   5) Post-Discharge Appointment Date and Location:

## 2019-08-29 NOTE — PROGRESS NOTE ADULT - PROBLEM SELECTOR PLAN 1
Pt presenting w/ complaints of abdominal pain radiating to the back. W/ elevated lipase and elevated AST/ALT with mildly dilated intrahepatic biliary ducts in setting of prior cholecystectomy. Denies recent alcohol use. Triglicerides 151. May be in the setting of non-obstructing stone vs. post surgical complications vs idiopathic pancreatitis  - C/w LR @200cc/hr  - Dilaudid 0.5mg q4 horus IV PRN for severe pain; oxycodone 5mg IR PRN for moderate pain  - Low fat mechanical soft diet as tolerated  - F/u ERCP  - F/u GI recs Pt presenting w/ complaints of abdominal pain radiating to the back. W/ elevated lipase and elevated AST/ALT with mildly dilated intrahepatic biliary ducts in setting of prior cholecystectomy. Denies recent alcohol use. Triglicerides 151. May be in the setting of non-obstructing stone vs. post surgical complications vs idiopathic pancreatitis  - C/w LR @200cc/hr  - Dilaudid 0.5mg q4 horus IV PRN for severe pain; oxycodone 5mg IR PRN for moderate pain  - Low fat mechanical soft diet as tolerated  - F/u ERCP to be done at 5pm (8/29)  - F/u GI recs Pt presenting w/ complaints of abdominal pain radiating to the back. W/ elevated lipase and elevated AST/ALT with mildly dilated intrahepatic biliary ducts in setting of prior cholecystectomy. Denies recent alcohol use. Triglycerides 151. May be in the setting of non-obstructing stone vs. post surgical complications vs idiopathic pancreatitis  - C/w LR @200cc/hr  - Dilaudid 0.5mg q4 horus IV PRN for severe pain; oxycodone 5mg IR PRN for moderate pain  - Low fat mechanical soft diet as tolerated  - F/u ERCP to be done at 5pm (8/29)  - F/u GI recs

## 2019-08-29 NOTE — PROGRESS NOTE ADULT - PROBLEM SELECTOR PLAN 6
1) PCP Contacted on Admission: (Y/N) --> Name & Phone #: Dr. Dahl in [facility]  2) Date of Contact with PCP:  3) PCP Contacted at Discharge: (Y/N)  4) Summary of Handoff Given to PCP:   5) Post-Discharge Appointment Date and Location: F: LR at 200cc/hr  E: replete K<4, Mg<2  N: Low fat mechanical soft, NPO for ERCP    VTE Prophylaxis: IMPROVE 0; SCDs  C: Full Code  D: RMF

## 2019-08-29 NOTE — PROGRESS NOTE ADULT - SUBJECTIVE AND OBJECTIVE BOX
Patient is a 33y old  Female who presents with a chief complaint of Acute pancreatitis (29 Aug 2019 11:48)      INTERVAL HPI/OVERNIGHT EVENTS:    Pt. seen and examined earlier today  Pt. reports epigastric pain improved/controlled w/ rx, radiates to back  Denies F/C, SOB, N/V/D    Review of Systems: 12 point review of systems otherwise negative    MEDICATIONS  (STANDING):  cefTRIAXone   IVPB 1000 milliGRAM(s) IV Intermittent every 24 hours  lactated ringers. 1000 milliLiter(s) (200 mL/Hr) IV Continuous <Continuous>  metroNIDAZOLE    Tablet 500 milliGRAM(s) Oral every 8 hours  polyethylene glycol 3350 17 Gram(s) Oral at bedtime    MEDICATIONS  (PRN):  HYDROmorphone  Injectable 0.5 milliGRAM(s) IV Push every 4 hours PRN Severe Pain (7 - 10)  ondansetron Injectable 4 milliGRAM(s) IV Push every 6 hours PRN Nausea and/or Vomiting  oxyCODONE    IR 5 milliGRAM(s) Oral every 6 hours PRN Moderate Pain (4 - 6)      Allergies    No Known Allergies    Intolerances          Vital Signs Last 24 Hrs  T(C): 36.8 (29 Aug 2019 14:41), Max: 36.8 (28 Aug 2019 20:32)  T(F): 98.3 (29 Aug 2019 14:41), Max: 98.3 (29 Aug 2019 07:27)  HR: 80 (29 Aug 2019 14:41) (79 - 99)  BP: 109/72 (29 Aug 2019 14:41) (100/67 - 133/89)  BP(mean): --  RR: 16 (29 Aug 2019 14:41) (16 - 18)  SpO2: 97% (29 Aug 2019 14:41) (97% - 100%)  CAPILLARY BLOOD GLUCOSE            Physical Exam:  (earlier today)  Daily     Daily   General:  non-toxic and comfortable-appearing in NAD  HEENT:  anicteric  Abdomen:  epigastric TTP no guarding, soft ND  Extremities: no edema B/L LE  Skin:  WWP, no jaundice  Neuro:  AAOx3, no asterixis    LABS:                        11.9   5.25  )-----------( 240      ( 29 Aug 2019 10:57 )             36.3     08-29    138  |  106  |  6<L>  ----------------------------<  111<H>  3.9   |  25  |  0.65    Ca    8.4      29 Aug 2019 10:57  Phos  3.7       Mg     1.9         TPro  6.1  /  Alb  3.9  /  TBili  2.4<H>  /  DBili  x   /  AST  851<H>  /  ALT  786<H>  /  AlkPhos  150<H>      PT/INR - ( 29 Aug 2019 10:57 )   PT: 12.6 sec;   INR: 1.11          PTT - ( 29 Aug 2019 10:57 )  PTT:31.5 sec  Urinalysis Basic - ( 28 Aug 2019 17:42 )    Color: Yellow / Appearance: Cloudy / S.020 / pH: x  Gluc: x / Ketone: Trace mg/dL  / Bili: Negative / Urobili: 0.2 E.U./dL   Blood: x / Protein: NEGATIVE mg/dL / Nitrite: NEGATIVE   Leuk Esterase: NEGATIVE / RBC: x / WBC x   Sq Epi: x / Non Sq Epi: x / Bacteria: x          RADIOLOGY & ADDITIONAL TESTS:    ---------------------------------------------------------------------------  I personally reviewed: [  ]EKG   [  ]CXR    [  ] CT    [  ]Other  ---------------------------------------------------------------------------  PLEASE CHECK WHEN PRESENT:     [  ]Heart Failure     [  ] Acute     [  ] Acute on Chronic     [  ] Chronic  -------------------------------------------------------------------     [  ]Diastolic [HFpEF]     [  ]Systolic [HFrEF]     [  ]Combined [HFpEF & HFrEF]     [  ]Other:  -------------------------------------------------------------------  [  ]THAO     [  ]ATN     [  ]Reneal Medullary Necrosis     [  ]Renal Cortical Necrosis     [  ]Other Pathological Lesions:    [  ]CKD 1  [  ]CKD 2  [  ]CKD 3  [  ]CKD 4  [  ]CKD 5  [  ]Other  -------------------------------------------------------------------  [  ]Other/Unspecified:    --------------------------------------------------------------------    Abdominal Nutritional Status  [  ]Malnutrition: See Nutrition Note  [  ]Cachexia  [  ]Other:   [  ]Supplement Ordered:  [  ]Morbid Obesity (BMI >=40]

## 2019-08-29 NOTE — CONSULT NOTE ADULT - SUBJECTIVE AND OBJECTIVE BOX
HPI:  34 y/o F w/ hx of cholecystectomy presenting w/ abd pain. Pt reports that she was in her normal state of health until yesterday when she developed sharp abd pain. She describes it as starting in her back and radiating around her whole abdomen. Pain was associated with chills, diaphoresis and nausea, but no vomiting. Pain similar to pain when she had her gallbladder removed two years ago. She does not take any medications. Drinks socially, last drink almost one month ago. No GI FH    Patient reports that her pain is now controlled since receiving pain meds. She last ate breakfast ( fries, floyd) this morning.       Allergies    No Known Allergies    Intolerances      Home Medications:    MEDICATIONS:  MEDICATIONS  (STANDING):  cefTRIAXone   IVPB 1000 milliGRAM(s) IV Intermittent every 24 hours  lactated ringers. 1000 milliLiter(s) (200 mL/Hr) IV Continuous <Continuous>  metroNIDAZOLE    Tablet 500 milliGRAM(s) Oral every 8 hours  polyethylene glycol 3350 17 Gram(s) Oral at bedtime    MEDICATIONS  (PRN):  HYDROmorphone  Injectable 0.5 milliGRAM(s) IV Push every 4 hours PRN Severe Pain (7 - 10)  ondansetron Injectable 4 milliGRAM(s) IV Push every 6 hours PRN Nausea and/or Vomiting  oxyCODONE    IR 5 milliGRAM(s) Oral every 6 hours PRN Moderate Pain (4 - 6)    PAST MEDICAL & SURGICAL HISTORY:  No pertinent past medical history  History of cholecystectomy    FAMILY HISTORY:  FH: breast cancer  FH: hyperlipidemia  FH: HTN (hypertension)  FH: diabetes mellitus  FH: asthma    SOCIAL HISTORY:  [place]ac[place]: denies  Alcohol: socially  Illicit Drugs: denies    REVIEW OF SYSTEMS:  CONSTITUTIONAL: No weakness, fevers or chills  HEENT: No visual changes; No vertigo or throat pain   NECK: No pain or stiffness  RESPIRATORY: No cough, wheezing, hemoptysis; No shortness of breath  CARDIOVASCULAR: No chest pain or palpitations  GASTROINTESTINAL: No abdominal or epigastric pain. No nausea, vomiting, or hematemesis; No diarrhea or constipation. No melena or hematochezia.  GENITOURINARY: No dysuria, frequency or hematuria  NEUROLOGICAL: No numbness or weakness  SKIN: No itching, burning, rashes, or lesions   All other 10 review of systems is negative unless indicated above.    Vital Signs Last 24 Hrs  T(C): 36.8 (29 Aug 2019 07:27), Max: 36.8 (28 Aug 2019 20:32)  T(F): 98.3 (29 Aug 2019 07:27), Max: 98.3 (29 Aug 2019 07:27)  HR: 79 (29 Aug 2019 07:27) (79 - 99)  BP: 101/67 (29 Aug 2019 07:27) (101/67 - 133/89)  BP(mean): --  RR: 18 (29 Aug 2019 07:27) (16 - 18)  SpO2: 98% (29 Aug 2019 07:27) (97% - 100%)      PHYSICAL EXAM:    General: Well developed; well nourished; in no acute distress  Eyes: Anicteric sclerae, moist conjunctivae  HENT: Moist mucous membranes  Neck: Trachea midline, supple  Lungs: Normal respiratory effors and no intercostal retractions  Cardiovascular: RRR  Abdomen: Soft, non-tender non-distended; Normal bowel sounds; No rebound or guarding  Extremities: Normal range of motion, No clubbing, cyanosis or edema  Neurological: Alert and oriented x3  Skin: Warm and dry. No obvious rash    LABS:                        11.9   6.27  )-----------( 239      ( 29 Aug 2019 05:45 )             37.0     08-29    134<L>  |  103  |  6<L>  ----------------------------<  111<H>  4.1   |  22  |  0.60    Ca    8.5      29 Aug 2019 05:45  Phos  3.7     08-29  Mg     1.9     08-29    TPro  6.4  /  Alb  3.8  /  TBili  2.1<H>  /  DBili  x   /  AST  1370<H>  /  ALT  988<H>  /  AlkPhos  143<H>  08-29      Culture Results:   No growth at 12 hours (08-28 @ 19:47)  Culture Results:   No growth at 12 hours (08-28 @ 19:47)    PT/INR - ( 29 Aug 2019 05:45 )   PT: 12.5 sec;   INR: 1.10          PTT - ( 29 Aug 2019 05:45 )  PTT:30.4 sec    Culture - Blood (collected 28 Aug 2019 19:47)  Source: .Blood Blood  Preliminary Report (29 Aug 2019 08:01):    No growth at 12 hours    Culture - Blood (collected 28 Aug 2019 19:47)  Source: .Blood Blood  Preliminary Report (29 Aug 2019 08:01):    No growth at 12 hours      RADIOLOGY & ADDITIONAL STUDIES:

## 2019-08-29 NOTE — PROGRESS NOTE ADULT - PROBLEM SELECTOR PLAN 3
Pt. aware; likely angiomyolipoma; no need for further inpatient work-up, cont. serial imaging as outpatient -- Pt. verbalized understanding

## 2019-08-29 NOTE — CONSULT NOTE ADULT - ASSESSMENT
32 y/o F w/ hx of cholecystectomy presenting w/ gallstone pancreatitis  - pt meets 2/3 criteria for pancreatitis: elevated lipase, abd pain  - Etiology is likely gallstone. No sign ETOH use. Triglycerides normal. Not on any medications known to cause pancreatitis  -CT abd/pelvis shows intrahepatic biliary ductal dilation. LT elevated  -Keep NPO  -IVF  -plan for ERCP this afternoon     Case discussed with Dr. Hansen 32 y/o F w/ hx of cholecystectomy presenting w/ gallstone pancreatitis  - pt meets 2/3 criteria for pancreatitis: elevated lipase, abd pain  - Etiology is likely gallstone. No sign ETOH use. Triglycerides normal. Not on any medications known to cause pancreatitis  -CT abd/pelvis shows intrahepatic biliary ductal dilation. LT elevated  -Keep NPO  -IVF  -plan for ERCP this afternoon   -kidney lesion on CT- workup per primary team     Case discussed with Dr. Hansen

## 2019-08-30 VITALS
OXYGEN SATURATION: 100 % | TEMPERATURE: 98 F | DIASTOLIC BLOOD PRESSURE: 70 MMHG | HEART RATE: 69 BPM | SYSTOLIC BLOOD PRESSURE: 102 MMHG | RESPIRATION RATE: 18 BRPM

## 2019-08-30 LAB
ALBUMIN SERPL ELPH-MCNC: 3.8 G/DL — SIGNIFICANT CHANGE UP (ref 3.3–5)
ALP SERPL-CCNC: 165 U/L — HIGH (ref 40–120)
ALT FLD-CCNC: 598 U/L — HIGH (ref 10–45)
ANION GAP SERPL CALC-SCNC: 8 MMOL/L — SIGNIFICANT CHANGE UP (ref 5–17)
APTT BLD: 33.5 SEC — SIGNIFICANT CHANGE UP (ref 27.5–36.3)
AST SERPL-CCNC: 326 U/L — HIGH (ref 10–40)
BILIRUB SERPL-MCNC: 0.8 MG/DL — SIGNIFICANT CHANGE UP (ref 0.2–1.2)
BUN SERPL-MCNC: 6 MG/DL — LOW (ref 7–23)
CALCIUM SERPL-MCNC: 9 MG/DL — SIGNIFICANT CHANGE UP (ref 8.4–10.5)
CHLORIDE SERPL-SCNC: 105 MMOL/L — SIGNIFICANT CHANGE UP (ref 96–108)
CO2 SERPL-SCNC: 24 MMOL/L — SIGNIFICANT CHANGE UP (ref 22–31)
CREAT SERPL-MCNC: 0.68 MG/DL — SIGNIFICANT CHANGE UP (ref 0.5–1.3)
GLUCOSE SERPL-MCNC: 111 MG/DL — HIGH (ref 70–99)
HCT VFR BLD CALC: 36 % — SIGNIFICANT CHANGE UP (ref 34.5–45)
HGB BLD-MCNC: 11.9 G/DL — SIGNIFICANT CHANGE UP (ref 11.5–15.5)
INR BLD: 1.15 — SIGNIFICANT CHANGE UP (ref 0.88–1.16)
MAGNESIUM SERPL-MCNC: 1.8 MG/DL — SIGNIFICANT CHANGE UP (ref 1.6–2.6)
MCHC RBC-ENTMCNC: 29.9 PG — SIGNIFICANT CHANGE UP (ref 27–34)
MCHC RBC-ENTMCNC: 33.1 GM/DL — SIGNIFICANT CHANGE UP (ref 32–36)
MCV RBC AUTO: 90.5 FL — SIGNIFICANT CHANGE UP (ref 80–100)
NRBC # BLD: 0 /100 WBCS — SIGNIFICANT CHANGE UP (ref 0–0)
PLATELET # BLD AUTO: 251 K/UL — SIGNIFICANT CHANGE UP (ref 150–400)
POTASSIUM SERPL-MCNC: 4.6 MMOL/L — SIGNIFICANT CHANGE UP (ref 3.5–5.3)
POTASSIUM SERPL-SCNC: 4.6 MMOL/L — SIGNIFICANT CHANGE UP (ref 3.5–5.3)
PROT SERPL-MCNC: 6.4 G/DL — SIGNIFICANT CHANGE UP (ref 6–8.3)
PROTHROM AB SERPL-ACNC: 13.1 SEC — HIGH (ref 10–12.9)
RBC # BLD: 3.98 M/UL — SIGNIFICANT CHANGE UP (ref 3.8–5.2)
RBC # FLD: 11.7 % — SIGNIFICANT CHANGE UP (ref 10.3–14.5)
SODIUM SERPL-SCNC: 137 MMOL/L — SIGNIFICANT CHANGE UP (ref 135–145)
WBC # BLD: 6.9 K/UL — SIGNIFICANT CHANGE UP (ref 3.8–10.5)
WBC # FLD AUTO: 6.9 K/UL — SIGNIFICANT CHANGE UP (ref 3.8–10.5)

## 2019-08-30 PROCEDURE — 99239 HOSP IP/OBS DSCHRG MGMT >30: CPT

## 2019-08-30 RX ORDER — OXYCODONE HYDROCHLORIDE 5 MG/1
1 TABLET ORAL
Qty: 8 | Refills: 0
Start: 2019-08-30 | End: 2019-08-31

## 2019-08-30 RX ADMIN — OXYCODONE HYDROCHLORIDE 5 MILLIGRAM(S): 5 TABLET ORAL at 10:06

## 2019-08-30 RX ADMIN — Medication 500 MILLIGRAM(S): at 09:07

## 2019-08-30 RX ADMIN — OXYCODONE HYDROCHLORIDE 5 MILLIGRAM(S): 5 TABLET ORAL at 09:06

## 2019-08-30 RX ADMIN — Medication 500 MILLIGRAM(S): at 00:29

## 2019-08-30 NOTE — DISCHARGE NOTE NURSING/CASE MANAGEMENT/SOCIAL WORK - PATIENT PORTAL LINK FT
You can access the FollowMyHealth Patient Portal offered by [facility] by registering at the following website: [email or web address]. By joining [employer]’s FollowMyHealth portal, you will also be able to view your health information using other applications (apps) compatible with our system.

## 2019-08-30 NOTE — PROGRESS NOTE ADULT - SUBJECTIVE AND OBJECTIVE BOX
Pt seen and examined at bedside.  Pt denies abd pain, nausea, vomiting. Tolerated apple juice this morning    PERTINENT REVIEW OF SYSTEMS:  CONSTITUTIONAL: No weakness, fevers or chills  HEENT: No visual changes; No vertigo or throat pain   GASTROINTESTINAL: No abdominal or epigastric pain. No nausea, vomiting, or hematemesis; No diarrhea or constipation. No melena or hematochezia.  NEUROLOGICAL: No numbness or weakness  SKIN: No itching, burning, rashes, or lesions     Allergies    No Known Allergies    Intolerances      MEDICATIONS:  MEDICATIONS  (STANDING):  cefTRIAXone   IVPB 1000 milliGRAM(s) IV Intermittent every 24 hours  lactated ringers. 1000 milliLiter(s) (100 mL/Hr) IV Continuous <Continuous>  metroNIDAZOLE    Tablet 500 milliGRAM(s) Oral every 8 hours  polyethylene glycol 3350 17 Gram(s) Oral at bedtime    MEDICATIONS  (PRN):  HYDROmorphone  Injectable 0.5 milliGRAM(s) IV Push every 4 hours PRN Severe Pain (7 - 10)  ondansetron Injectable 4 milliGRAM(s) IV Push every 6 hours PRN Nausea and/or Vomiting  oxyCODONE    IR 5 milliGRAM(s) Oral every 6 hours PRN Moderate Pain (4 - 6)    Vital Signs Last 24 Hrs  T(C): 36.9 (30 Aug 2019 05:37), Max: 37.2 (29 Aug 2019 16:10)  T(F): 98.5 (30 Aug 2019 05:37), Max: 99 (29 Aug 2019 16:10)  HR: 66 (30 Aug 2019 05:37) (66 - 94)  BP: 102/68 (30 Aug 2019 05:37) (100/67 - 112/74)  BP(mean): --  RR: 16 (30 Aug 2019 05:37) (16 - 18)  SpO2: 97% (30 Aug 2019 05:37) (95% - 97%)    PHYSICAL EXAM:    General: Well developed; well nourished; in no acute distress  HEENT: MMM, conjunctiva and sclera clear  Gastrointestinal: Soft non-tender non-distended; Normal bowel sounds; No hepatosplenomegaly. No rebound or guarding  Skin: Warm and dry. No obvious rash    LABS:                        11.9   6.90  )-----------( 251      ( 30 Aug 2019 06:08 )             36.0     08-30    137  |  105  |  6<L>  ----------------------------<  111<H>  4.6   |  24  |  0.68    Ca    9.0      30 Aug 2019 06:08  Phos  3.7     08  Mg     1.8         TPro  6.4  /  Alb  3.8  /  TBili  0.8  /  DBili  x   /  AST  326<H>  /  ALT  598<H>  /  AlkPhos  165<H>      PT/INR - ( 30 Aug 2019 06:08 )   PT: 13.1 sec;   INR: 1.15          PTT - ( 30 Aug 2019 06:08 )  PTT:33.5 sec      Urinalysis Basic - ( 28 Aug 2019 17:42 )    Color: Yellow / Appearance: Cloudy / S.020 / pH: x  Gluc: x / Ketone: Trace mg/dL  / Bili: Negative / Urobili: 0.2 E.U./dL   Blood: x / Protein: NEGATIVE mg/dL / Nitrite: NEGATIVE   Leuk Esterase: NEGATIVE / RBC: x / WBC x   Sq Epi: x / Non Sq Epi: x / Bacteria: x                Culture - Blood (collected 28 Aug 2019 19:47)  Source: .Blood Blood  Preliminary Report (29 Aug 2019 20:10):    No growth at 1 day.    Culture - Blood (collected 28 Aug 2019 19:47)  Source: .Blood Blood  Preliminary Report (29 Aug 2019 20:10):    No growth at 1 day.      RADIOLOGY & ADDITIONAL STUDIES:

## 2019-08-30 NOTE — DISCHARGE NOTE PROVIDER - NSDCCPCAREPLAN_GEN_ALL_CORE_FT
PRINCIPAL DISCHARGE DIAGNOSIS  Diagnosis: Acute pancreatitis  Assessment and Plan of Treatment: You presented with abdominal pain and we found an acute rise in your liver enzymes. We performed a procedure called an ERCP that showed thick secretions in your biliary tree. We treated you with antibiotics while admitted but you no longer require them.   1. See your PCP for a routine LFT check within two weeks  2. Eat a low fat diet for the next month   3. Avoid cigarrettes and alchohol for the next month   4. If you have worsening abdominal pain and new fevers call your PCP immediately who may advise you to report to ED PRINCIPAL DISCHARGE DIAGNOSIS  Diagnosis: Acute pancreatitis  Assessment and Plan of Treatment: You presented with abdominal pain and we found an acute rise in your liver enzymes. We performed a procedure called an ERCP that showed thick secretions in your biliary tree. We treated you with antibiotics while admitted but you no longer require them.   1. See your PCP to request a "Complete Metabolic Panel" to evaluate that your "liver enzymes (AST, ALT, ALP) have returned to their normal baseline in TWO WEEKS   2. Eat a low fat diet for the next month   3. NO cigarrettes and alchohol for the next month   4. If you have worsening abdominal pain and new fevers call your PCP immediately who may advise you to report to ED      SECONDARY DISCHARGE DIAGNOSES  Diagnosis: Renal angiolipoma  Assessment and Plan of Treatment: You have a  known history of 1cm cyst or mass lesion in your left kidney. On your repeat imaging it was measured at 2cm. This could be anything from a simple renal cyst or it could be cancerous lesion. Your kidney fuction is normal. You can see your PCP who may suggest an MRI for furthur characterization. Discuss this with your PCP at your follow up visit

## 2019-08-30 NOTE — PROGRESS NOTE ADULT - SUBJECTIVE AND OBJECTIVE BOX
Patient is a 33y old  Female who presents with a chief complaint of Acute pancreatitis (30 Aug 2019 12:03)      INTERVAL HPI/OVERNIGHT EVENTS:    Pt. seen and examined at 1PM  Pt. feels much better today  Tolerating regular diet  Abdominal pain improved, controlled w/ rx  Denies F/C, CP, SOB, N/V/D    Review of Systems: 12 point review of systems otherwise negative    MEDICATIONS  (STANDING):  polyethylene glycol 3350 17 Gram(s) Oral at bedtime    MEDICATIONS  (PRN):  HYDROmorphone  Injectable 0.5 milliGRAM(s) IV Push every 4 hours PRN Severe Pain (7 - 10)  ondansetron Injectable 4 milliGRAM(s) IV Push every 6 hours PRN Nausea and/or Vomiting  oxyCODONE    IR 5 milliGRAM(s) Oral every 6 hours PRN Moderate Pain (4 - 6)      Allergies    No Known Allergies    Intolerances          Vital Signs Last 24 Hrs  T(C): 36.7 (30 Aug 2019 08:53), Max: 36.9 (30 Aug 2019 05:37)  T(F): 98.1 (30 Aug 2019 08:53), Max: 98.5 (30 Aug 2019 05:37)  HR: 69 (30 Aug 2019 08:53) (66 - 94)  BP: 102/70 (30 Aug 2019 08:53) (102/68 - 112/74)  BP(mean): --  RR: 18 (30 Aug 2019 08:53) (16 - 18)  SpO2: 100% (30 Aug 2019 08:53) (96% - 100%)  CAPILLARY BLOOD GLUCOSE            Physical Exam:  (at 1PM)  Daily     Daily   General:  non-toxic and comfortable-appearing in NAD  HEENT:  anicteric, MMM  Abdomen:  epigastric TTP no guarding, soft ND  Extremities: no edema B/L LE  Skin:  WWP, no jaundice  Neuro:  AAOx3, no asterixis    LABS:                        11.9   6.90  )-----------( 251      ( 30 Aug 2019 06:08 )             36.0     08-30    137  |  105  |  6<L>  ----------------------------<  111<H>  4.6   |  24  |  0.68    Ca    9.0      30 Aug 2019 06:08  Phos  3.7     08-29  Mg     1.8         TPro  6.4  /  Alb  3.8  /  TBili  0.8  /  DBili  x   /  AST  326<H>  /  ALT  598<H>  /  AlkPhos  165<H>      PT/INR - ( 30 Aug 2019 06:08 )   PT: 13.1 sec;   INR: 1.15          PTT - ( 30 Aug 2019 06:08 )  PTT:33.5 sec  Urinalysis Basic - ( 28 Aug 2019 17:42 )    Color: Yellow / Appearance: Cloudy / S.020 / pH: x  Gluc: x / Ketone: Trace mg/dL  / Bili: Negative / Urobili: 0.2 E.U./dL   Blood: x / Protein: NEGATIVE mg/dL / Nitrite: NEGATIVE   Leuk Esterase: NEGATIVE / RBC: x / WBC x   Sq Epi: x / Non Sq Epi: x / Bacteria: x          RADIOLOGY & ADDITIONAL TESTS:    ---------------------------------------------------------------------------  I personally reviewed: [  ]EKG   [  ]CXR    [  ] CT    [  ]Other  ---------------------------------------------------------------------------  PLEASE CHECK WHEN PRESENT:     [  ]Heart Failure     [  ] Acute     [  ] Acute on Chronic     [  ] Chronic  -------------------------------------------------------------------     [  ]Diastolic [HFpEF]     [  ]Systolic [HFrEF]     [  ]Combined [HFpEF & HFrEF]     [  ]Other:  -------------------------------------------------------------------  [  ]THAO     [  ]ATN     [  ]Reneal Medullary Necrosis     [  ]Renal Cortical Necrosis     [  ]Other Pathological Lesions:    [  ]CKD 1  [  ]CKD 2  [  ]CKD 3  [  ]CKD 4  [  ]CKD 5  [  ]Other  -------------------------------------------------------------------  [  ]Other/Unspecified:    --------------------------------------------------------------------    Abdominal Nutritional Status  [  ]Malnutrition: See Nutrition Note  [  ]Cachexia  [  ]Other:   [  ]Supplement Ordered:  [  ]Morbid Obesity (BMI >=40]

## 2019-08-30 NOTE — DISCHARGE NOTE NURSING/CASE MANAGEMENT/SOCIAL WORK - NSDCFUADDAPPT_GEN_ALL_CORE_FT
1. See your PCP within two weeks. If you do not have a PCP or have no insurance you can get services at [facility] ([facility], [facility])

## 2019-08-30 NOTE — PROGRESS NOTE ADULT - ASSESSMENT
32 y/o F w/ hx of cholecystectomy presenting w/ gallstone pancreatitis  - pt meets 2/3 criteria for pancreatitis: elevated lipase, abd pain  - Etiology is likely microlithiasis/sludge. No sign ETOH use. Triglycerides normal. Not on any medications known to cause pancreatitis  -CT abd/pelvis shows intrahepatic biliary ductal dilation. LT elevated  -s/p ERCP- microlithiasis removed with retrieving balloon  -post procedure- LT are downtrending, VSS  -advance diet as tolerated    Case discussed with Dr. Hansen. Follow up with GI upon discharge to ensure normalization of LTS. Thank you 32 y/o F w/ hx of cholecystectomy presenting w/ gallstone pancreatitis  - pt meets 2/3 criteria for pancreatitis: elevated lipase, abd pain  - Etiology is likely microlithiasis/sludge. No sign ETOH use. Triglycerides normal. Not on any medications known to cause pancreatitis  -CT abd/pelvis shows intrahepatic biliary ductal dilation. LT elevated  -s/p ERCP- sphincterotomy, microlithiasis removed with retrieving balloon  -post procedure- LT are downtrending, VSS  -advance diet as tolerated    Case discussed with Dr. Hansen. Follow up with GI upon discharge to ensure normalization of LTS. Thank you 34 y/o F w/ hx of cholecystectomy presenting w/ gallstone pancreatitis  - pt meets 2/3 criteria for pancreatitis: elevated lipase, abd pain  - Etiology is likely microlithiasis/sludge. No sign ETOH use. Triglycerides normal. Not on any medications known to cause pancreatitis  -CT abd/pelvis shows intrahepatic biliary ductal dilation. LT elevated  -s/p ERCP- sphincterotomy, microlithiasis removed with retrieving balloon  -post procedure- abd pain resolved, LT are downtrending, VSS  -advance diet as tolerated    Case discussed with Dr. Hansen. Follow up with GI upon discharge to ensure normalization of LTS, otherwise will need full liver workup. Thank you 34 y/o F w/ hx of cholecystectomy presenting w/ gallstone pancreatitis  - pt meets 2/3 criteria for pancreatitis: elevated lipase, abd pain  - Etiology is likely microlithiasis/sludge. No sign ETOH use. Triglycerides normal. Not on any medications known to cause pancreatitis  -CT abd/pelvis shows intrahepatic biliary ductal dilation. LT elevated  -s/p ERCP- sphincterotomy, microlithiasis/sludge removed with retrieving balloon  -post procedure- abd pain resolved, LT are downtrending, VSS  -advance diet as tolerated    Case discussed with Dr. Hansen. Follow up with GI upon discharge to ensure normalization of LTS, otherwise will need full liver workup. Thank you

## 2019-08-30 NOTE — PROGRESS NOTE ADULT - PROBLEM SELECTOR PLAN 2
d/t above; #s normalizing s/p ERCP; monitor LFTs; Pt. advised to avoid Tylenol and alcohol for now; Pt. verbalized plan to f/u w/ GI or PMD to have LFTs repeated as outpatient, may need further work-up if persistently elevated

## 2019-08-30 NOTE — PROGRESS NOTE ADULT - PROBLEM SELECTOR PLAN 1
likely d/t gallstones; GI following, s/p ERCP 8/29 w/ sphincterotomy, microlithiasis, sludge removed with retrieving balloon; cont. supportive care, low-fat diet, fluids, pain control (I-STOP reviewed, I wrote rx for oxycodone 5mg PRN, disp #8)

## 2019-08-30 NOTE — DISCHARGE NOTE PROVIDER - HOSPITAL COURSE
32 y/o F w/ hx of cholecystectomy presenting w/ gallstone pancreatitis    1. Gallstone pancreatitis: underwent ERCP- sphincterotomy, microlithiasis removed with retrieving balloon        Inpatient treatment course: treated with Ceftriaxone and Flagyl while admitted     New medications: none    Labs to be followed outpatient: ALP, AST, ALT     Exam to be followed outpatient: right sided abdominal pain

## 2019-08-30 NOTE — DISCHARGE NOTE PROVIDER - PROVIDER TOKENS
PROVIDER:[TOKEN:[[id number]]] FREE:[LAST:[Hesham],PHONE:[(   )    -],FAX:[(   )    -],ADDRESS:[Dr. Dahl in [facility]]]

## 2019-08-30 NOTE — DISCHARGE NOTE PROVIDER - CARE PROVIDER_API CALL
Eric Hansen)  [facility] at [address] [place] [address], [address]  [place], [place] [id number]  Phone: [phone]  Fax: [phone]  Follow Up Time: Dr. Hesham Dahl in [facility]  Phone: (   )    -  Fax: (   )    -  Follow Up Time:

## 2019-08-30 NOTE — DISCHARGE NOTE PROVIDER - NSDCFUADDAPPT_GEN_ALL_CORE_FT
1. Dr. Hansen's office will call you with an appointment  2. See your PCP within one week of dc 1. See your PCP within two weeks. If you do not have a PCP or have no insurance you can get services at [facility] ([facility], [facility])

## 2019-09-02 LAB
CULTURE RESULTS: SIGNIFICANT CHANGE UP
CULTURE RESULTS: SIGNIFICANT CHANGE UP
SPECIMEN SOURCE: SIGNIFICANT CHANGE UP
SPECIMEN SOURCE: SIGNIFICANT CHANGE UP

## 2019-09-05 DIAGNOSIS — Z90.49 ACQUIRED ABSENCE OF OTHER SPECIFIED PARTS OF DIGESTIVE TRACT: ICD-10-CM

## 2019-09-05 DIAGNOSIS — K59.00 CONSTIPATION, UNSPECIFIED: ICD-10-CM

## 2019-09-05 DIAGNOSIS — K85.10 BILIARY ACUTE PANCREATITIS WITHOUT NECROSIS OR INFECTION: ICD-10-CM

## 2019-09-05 DIAGNOSIS — N28.9 DISORDER OF KIDNEY AND URETER, UNSPECIFIED: ICD-10-CM

## 2019-09-05 DIAGNOSIS — K83.8 OTHER SPECIFIED DISEASES OF BILIARY TRACT: ICD-10-CM

## 2019-10-31 PROCEDURE — 84702 CHORIONIC GONADOTROPIN TEST: CPT

## 2019-10-31 PROCEDURE — 96374 THER/PROPH/DIAG INJ IV PUSH: CPT | Mod: XU

## 2019-10-31 PROCEDURE — 85610 PROTHROMBIN TIME: CPT

## 2019-10-31 PROCEDURE — 80053 COMPREHEN METABOLIC PANEL: CPT

## 2019-10-31 PROCEDURE — 83605 ASSAY OF LACTIC ACID: CPT

## 2019-10-31 PROCEDURE — 83735 ASSAY OF MAGNESIUM: CPT

## 2019-10-31 PROCEDURE — C1769: CPT

## 2019-10-31 PROCEDURE — 87040 BLOOD CULTURE FOR BACTERIA: CPT

## 2019-10-31 PROCEDURE — 85730 THROMBOPLASTIN TIME PARTIAL: CPT

## 2019-10-31 PROCEDURE — 96375 TX/PRO/DX INJ NEW DRUG ADDON: CPT

## 2019-10-31 PROCEDURE — 84100 ASSAY OF PHOSPHORUS: CPT

## 2019-10-31 PROCEDURE — 85027 COMPLETE CBC AUTOMATED: CPT

## 2019-10-31 PROCEDURE — 36415 COLL VENOUS BLD VENIPUNCTURE: CPT

## 2019-10-31 PROCEDURE — 80061 LIPID PANEL: CPT

## 2019-10-31 PROCEDURE — 86900 BLOOD TYPING SEROLOGIC ABO: CPT

## 2019-10-31 PROCEDURE — 86901 BLOOD TYPING SEROLOGIC RH(D): CPT

## 2019-10-31 PROCEDURE — 86850 RBC ANTIBODY SCREEN: CPT

## 2019-10-31 PROCEDURE — 85652 RBC SED RATE AUTOMATED: CPT

## 2019-10-31 PROCEDURE — 74177 CT ABD & PELVIS W/CONTRAST: CPT

## 2019-10-31 PROCEDURE — 86140 C-REACTIVE PROTEIN: CPT

## 2019-10-31 PROCEDURE — 74022 RADEX COMPL AQT ABD SERIES: CPT

## 2019-10-31 PROCEDURE — 74330 X-RAY BILE/PANC ENDOSCOPY: CPT

## 2019-10-31 PROCEDURE — 76705 ECHO EXAM OF ABDOMEN: CPT

## 2019-10-31 PROCEDURE — 83690 ASSAY OF LIPASE: CPT

## 2019-10-31 PROCEDURE — 85025 COMPLETE CBC W/AUTO DIFF WBC: CPT

## 2019-10-31 PROCEDURE — 99285 EMERGENCY DEPT VISIT HI MDM: CPT | Mod: 25

## 2019-10-31 PROCEDURE — 96376 TX/PRO/DX INJ SAME DRUG ADON: CPT

## 2020-11-27 NOTE — H&P ADULT - PROBLEM SELECTOR PLAN 1
Price (Do Not Change): 0.00 Instructions: This plan will send the code FBSE to the PM system.  DO NOT or CHANGE the price. Detail Level: Simple Pt presenting w/ complaints of abdominal pain radiating to the back. W/ elevated lipase and elevated AST/ALT. May be in the setting of non-obstructing stone vs. idiopathic pancreatitis  - C/w LR @200cc/hr  - Dilaudid 0.5mg q4 horus IV PRN for severe pain; oxycodone 5mg IR PRN for moderate pain  - Low fat mechanical soft diet as tolerated  - F/u MRCP  - F/u GI recs Pt presenting w/ complaints of abdominal pain radiating to the back. W/ elevated lipase and elevated AST/ALT with mildly dilated intrahepatic biliary ducts in setting of prior cholecystectomy. Denies recent alcohol use. Triglicerides 151. May be in the setting of non-obstructing stone vs. post surgical complications vs idiopathic pancreatitis  - C/w LR @200cc/hr  - Dilaudid 0.5mg q4 horus IV PRN for severe pain; oxycodone 5mg IR PRN for moderate pain  - Low fat mechanical soft diet as tolerated  - F/u MRCP  - F/u GI recs

## 2022-02-21 VITALS
WEIGHT: 119.93 LBS | RESPIRATION RATE: 16 BRPM | SYSTOLIC BLOOD PRESSURE: 117 MMHG | TEMPERATURE: 99 F | HEIGHT: 67 IN | OXYGEN SATURATION: 99 % | DIASTOLIC BLOOD PRESSURE: 71 MMHG | HEART RATE: 95 BPM

## 2022-02-21 LAB
ALBUMIN SERPL ELPH-MCNC: 5 G/DL — SIGNIFICANT CHANGE UP (ref 3.3–5)
ALP SERPL-CCNC: 73 U/L — SIGNIFICANT CHANGE UP (ref 40–120)
ALT FLD-CCNC: 6 U/L — LOW (ref 10–45)
ANION GAP SERPL CALC-SCNC: 12 MMOL/L — SIGNIFICANT CHANGE UP (ref 5–17)
APPEARANCE UR: CLEAR — SIGNIFICANT CHANGE UP
APTT BLD: 37.8 SEC — HIGH (ref 27.5–35.5)
AST SERPL-CCNC: 11 U/L — SIGNIFICANT CHANGE UP (ref 10–40)
BASOPHILS # BLD AUTO: 0.05 K/UL — SIGNIFICANT CHANGE UP (ref 0–0.2)
BASOPHILS NFR BLD AUTO: 0.5 % — SIGNIFICANT CHANGE UP (ref 0–2)
BILIRUB SERPL-MCNC: 0.5 MG/DL — SIGNIFICANT CHANGE UP (ref 0.2–1.2)
BILIRUB UR-MCNC: NEGATIVE — SIGNIFICANT CHANGE UP
BUN SERPL-MCNC: 17 MG/DL — SIGNIFICANT CHANGE UP (ref 7–23)
CALCIUM SERPL-MCNC: 9.9 MG/DL — SIGNIFICANT CHANGE UP (ref 8.4–10.5)
CHLORIDE SERPL-SCNC: 103 MMOL/L — SIGNIFICANT CHANGE UP (ref 96–108)
CO2 SERPL-SCNC: 25 MMOL/L — SIGNIFICANT CHANGE UP (ref 22–31)
COLOR SPEC: YELLOW — SIGNIFICANT CHANGE UP
CREAT SERPL-MCNC: 1.06 MG/DL — SIGNIFICANT CHANGE UP (ref 0.5–1.3)
DIFF PNL FLD: NEGATIVE — SIGNIFICANT CHANGE UP
EOSINOPHIL # BLD AUTO: 0.06 K/UL — SIGNIFICANT CHANGE UP (ref 0–0.5)
EOSINOPHIL NFR BLD AUTO: 0.6 % — SIGNIFICANT CHANGE UP (ref 0–6)
GLUCOSE SERPL-MCNC: 90 MG/DL — SIGNIFICANT CHANGE UP (ref 70–99)
GLUCOSE UR QL: NEGATIVE — SIGNIFICANT CHANGE UP
HCG SERPL-ACNC: <0 MIU/ML — SIGNIFICANT CHANGE UP
HCT VFR BLD CALC: 42.1 % — SIGNIFICANT CHANGE UP (ref 34.5–45)
HGB BLD-MCNC: 13.5 G/DL — SIGNIFICANT CHANGE UP (ref 11.5–15.5)
IMM GRANULOCYTES NFR BLD AUTO: 0.4 % — SIGNIFICANT CHANGE UP (ref 0–1.5)
INR BLD: 1.05 — SIGNIFICANT CHANGE UP (ref 0.88–1.16)
KETONES UR-MCNC: NEGATIVE — SIGNIFICANT CHANGE UP
LEUKOCYTE ESTERASE UR-ACNC: NEGATIVE — SIGNIFICANT CHANGE UP
LYMPHOCYTES # BLD AUTO: 2.3 K/UL — SIGNIFICANT CHANGE UP (ref 1–3.3)
LYMPHOCYTES # BLD AUTO: 21.9 % — SIGNIFICANT CHANGE UP (ref 13–44)
MCHC RBC-ENTMCNC: 28.4 PG — SIGNIFICANT CHANGE UP (ref 27–34)
MCHC RBC-ENTMCNC: 32.1 GM/DL — SIGNIFICANT CHANGE UP (ref 32–36)
MCV RBC AUTO: 88.4 FL — SIGNIFICANT CHANGE UP (ref 80–100)
MONOCYTES # BLD AUTO: 0.64 K/UL — SIGNIFICANT CHANGE UP (ref 0–0.9)
MONOCYTES NFR BLD AUTO: 6.1 % — SIGNIFICANT CHANGE UP (ref 2–14)
NEUTROPHILS # BLD AUTO: 7.42 K/UL — HIGH (ref 1.8–7.4)
NEUTROPHILS NFR BLD AUTO: 70.5 % — SIGNIFICANT CHANGE UP (ref 43–77)
NITRITE UR-MCNC: NEGATIVE — SIGNIFICANT CHANGE UP
NRBC # BLD: 0 /100 WBCS — SIGNIFICANT CHANGE UP (ref 0–0)
PH UR: 7 — SIGNIFICANT CHANGE UP (ref 5–8)
PLATELET # BLD AUTO: 344 K/UL — SIGNIFICANT CHANGE UP (ref 150–400)
POTASSIUM SERPL-MCNC: 4 MMOL/L — SIGNIFICANT CHANGE UP (ref 3.5–5.3)
POTASSIUM SERPL-SCNC: 4 MMOL/L — SIGNIFICANT CHANGE UP (ref 3.5–5.3)
PROT SERPL-MCNC: 7.4 G/DL — SIGNIFICANT CHANGE UP (ref 6–8.3)
PROT UR-MCNC: NEGATIVE MG/DL — SIGNIFICANT CHANGE UP
PROTHROM AB SERPL-ACNC: 12.6 SEC — SIGNIFICANT CHANGE UP (ref 10.6–13.6)
RBC # BLD: 4.76 M/UL — SIGNIFICANT CHANGE UP (ref 3.8–5.2)
RBC # FLD: 12.2 % — SIGNIFICANT CHANGE UP (ref 10.3–14.5)
SARS-COV-2 RNA SPEC QL NAA+PROBE: NEGATIVE — SIGNIFICANT CHANGE UP
SODIUM SERPL-SCNC: 140 MMOL/L — SIGNIFICANT CHANGE UP (ref 135–145)
SP GR SPEC: 1.01 — SIGNIFICANT CHANGE UP (ref 1–1.03)
UROBILINOGEN FLD QL: 0.2 E.U./DL — SIGNIFICANT CHANGE UP
WBC # BLD: 10.51 K/UL — HIGH (ref 3.8–10.5)
WBC # FLD AUTO: 10.51 K/UL — HIGH (ref 3.8–10.5)

## 2022-02-21 PROCEDURE — 70496 CT ANGIOGRAPHY HEAD: CPT | Mod: 26,MA

## 2022-02-21 PROCEDURE — 70450 CT HEAD/BRAIN W/O DYE: CPT | Mod: 26,59,MA

## 2022-02-21 PROCEDURE — 99285 EMERGENCY DEPT VISIT HI MDM: CPT

## 2022-02-21 PROCEDURE — 70498 CT ANGIOGRAPHY NECK: CPT | Mod: 26,MA

## 2022-02-21 PROCEDURE — 93010 ELECTROCARDIOGRAM REPORT: CPT

## 2022-02-21 RX ORDER — SODIUM CHLORIDE 9 MG/ML
1000 INJECTION INTRAMUSCULAR; INTRAVENOUS; SUBCUTANEOUS ONCE
Refills: 0 | Status: COMPLETED | OUTPATIENT
Start: 2022-02-21 | End: 2022-02-21

## 2022-02-21 RX ORDER — ACETAMINOPHEN 500 MG
975 TABLET ORAL ONCE
Refills: 0 | Status: COMPLETED | OUTPATIENT
Start: 2022-02-21 | End: 2022-02-21

## 2022-02-21 RX ORDER — METOCLOPRAMIDE HCL 10 MG
10 TABLET ORAL ONCE
Refills: 0 | Status: COMPLETED | OUTPATIENT
Start: 2022-02-21 | End: 2022-02-21

## 2022-02-21 NOTE — ED ADULT NURSE NOTE - OBJECTIVE STATEMENT
36 y F, complaining of dizziness, states she has had a headache x 2 days, with right sided numbness x 2 days, pt states hx of aneurysm, and hx of stroke when 16 with left sided facial droop. pt denies chest pain, sob, nausea, vomiting, fever. IV line placed, neurologically intact, denies dizziness, numbness, full ROM, finger stick 92.

## 2022-02-21 NOTE — ED ADULT TRIAGE NOTE - CHIEF COMPLAINT QUOTE
Pt presents to ED C/O dizziness, migraine, blurred vision, R sided facial numbness and " heaviness feeling" to R extremities x 1 week. States, " The migraine started about 2 days ago". Pt dx with brain aneurism x 2 years ago. States, " They told it me it was too small to operate and to montior". Pt speaking full sentences, A&O x4, ambulating independently, no slurred speech or facial droop noted at this time.

## 2022-02-22 ENCOUNTER — INPATIENT (INPATIENT)
Facility: HOSPITAL | Age: 37
LOS: 0 days | Discharge: HOME | DRG: 103 | End: 2022-02-23
Attending: PSYCHIATRY & NEUROLOGY | Admitting: PSYCHIATRY & NEUROLOGY
Payer: COMMERCIAL

## 2022-02-22 DIAGNOSIS — Z90.49 ACQUIRED ABSENCE OF OTHER SPECIFIED PARTS OF DIGESTIVE TRACT: Chronic | ICD-10-CM

## 2022-02-22 PROBLEM — Z78.9 OTHER SPECIFIED HEALTH STATUS: Chronic | Status: ACTIVE | Noted: 2019-08-28

## 2022-02-22 LAB
A1C WITH ESTIMATED AVERAGE GLUCOSE RESULT: 5 % — SIGNIFICANT CHANGE UP (ref 4–5.6)
ANION GAP SERPL CALC-SCNC: 10 MMOL/L — SIGNIFICANT CHANGE UP (ref 5–17)
BUN SERPL-MCNC: 15 MG/DL — SIGNIFICANT CHANGE UP (ref 7–23)
CALCIUM SERPL-MCNC: 8.7 MG/DL — SIGNIFICANT CHANGE UP (ref 8.4–10.5)
CHLORIDE SERPL-SCNC: 109 MMOL/L — HIGH (ref 96–108)
CHOLEST SERPL-MCNC: 153 MG/DL — SIGNIFICANT CHANGE UP
CO2 SERPL-SCNC: 20 MMOL/L — LOW (ref 22–31)
CREAT SERPL-MCNC: 0.86 MG/DL — SIGNIFICANT CHANGE UP (ref 0.5–1.3)
ESTIMATED AVERAGE GLUCOSE: 97 MG/DL — SIGNIFICANT CHANGE UP (ref 68–114)
GLUCOSE SERPL-MCNC: 95 MG/DL — SIGNIFICANT CHANGE UP (ref 70–99)
HDLC SERPL-MCNC: 44 MG/DL — LOW
LIPID PNL WITH DIRECT LDL SERPL: 94 MG/DL — SIGNIFICANT CHANGE UP
NON HDL CHOLESTEROL: 109 MG/DL — SIGNIFICANT CHANGE UP
POTASSIUM SERPL-MCNC: 3.6 MMOL/L — SIGNIFICANT CHANGE UP (ref 3.5–5.3)
POTASSIUM SERPL-SCNC: 3.6 MMOL/L — SIGNIFICANT CHANGE UP (ref 3.5–5.3)
SODIUM SERPL-SCNC: 139 MMOL/L — SIGNIFICANT CHANGE UP (ref 135–145)
TRIGL SERPL-MCNC: 73 MG/DL — SIGNIFICANT CHANGE UP

## 2022-02-22 PROCEDURE — 99223 1ST HOSP IP/OBS HIGH 75: CPT

## 2022-02-22 PROCEDURE — 70551 MRI BRAIN STEM W/O DYE: CPT | Mod: 26

## 2022-02-22 RX ORDER — METOCLOPRAMIDE HCL 10 MG
10 TABLET ORAL ONCE
Refills: 0 | Status: COMPLETED | OUTPATIENT
Start: 2022-02-22 | End: 2022-02-22

## 2022-02-22 RX ORDER — TOPIRAMATE 25 MG
25 TABLET ORAL DAILY
Refills: 0 | Status: DISCONTINUED | OUTPATIENT
Start: 2022-02-22 | End: 2022-02-23

## 2022-02-22 RX ORDER — VALPROIC ACID (AS SODIUM SALT) 250 MG/5ML
1000 SOLUTION, ORAL ORAL ONCE
Refills: 0 | Status: COMPLETED | OUTPATIENT
Start: 2022-02-22 | End: 2022-02-22

## 2022-02-22 RX ORDER — ATORVASTATIN CALCIUM 80 MG/1
80 TABLET, FILM COATED ORAL AT BEDTIME
Refills: 0 | Status: DISCONTINUED | OUTPATIENT
Start: 2022-02-22 | End: 2022-02-23

## 2022-02-22 RX ORDER — ASPIRIN/CALCIUM CARB/MAGNESIUM 324 MG
81 TABLET ORAL DAILY
Refills: 0 | Status: DISCONTINUED | OUTPATIENT
Start: 2022-02-22 | End: 2022-02-23

## 2022-02-22 RX ORDER — MAGNESIUM SULFATE 500 MG/ML
1 VIAL (ML) INJECTION ONCE
Refills: 0 | Status: COMPLETED | OUTPATIENT
Start: 2022-02-22 | End: 2022-02-22

## 2022-02-22 RX ORDER — CLOPIDOGREL BISULFATE 75 MG/1
75 TABLET, FILM COATED ORAL DAILY
Refills: 0 | Status: DISCONTINUED | OUTPATIENT
Start: 2022-02-22 | End: 2022-02-23

## 2022-02-22 RX ORDER — ENOXAPARIN SODIUM 100 MG/ML
40 INJECTION SUBCUTANEOUS AT BEDTIME
Refills: 0 | Status: DISCONTINUED | OUTPATIENT
Start: 2022-02-22 | End: 2022-02-23

## 2022-02-22 RX ADMIN — Medication 975 MILLIGRAM(S): at 00:01

## 2022-02-22 RX ADMIN — Medication 30 MILLIGRAM(S): at 16:28

## 2022-02-22 RX ADMIN — SODIUM CHLORIDE 2000 MILLILITER(S): 9 INJECTION INTRAMUSCULAR; INTRAVENOUS; SUBCUTANEOUS at 00:08

## 2022-02-22 RX ADMIN — Medication 100 GRAM(S): at 19:25

## 2022-02-22 RX ADMIN — Medication 81 MILLIGRAM(S): at 15:13

## 2022-02-22 RX ADMIN — Medication 25 MILLIGRAM(S): at 17:28

## 2022-02-22 RX ADMIN — Medication 10 MILLIGRAM(S): at 00:02

## 2022-02-22 RX ADMIN — ATORVASTATIN CALCIUM 80 MILLIGRAM(S): 80 TABLET, FILM COATED ORAL at 22:07

## 2022-02-22 RX ADMIN — ENOXAPARIN SODIUM 40 MILLIGRAM(S): 100 INJECTION SUBCUTANEOUS at 22:06

## 2022-02-22 RX ADMIN — CLOPIDOGREL BISULFATE 75 MILLIGRAM(S): 75 TABLET, FILM COATED ORAL at 15:13

## 2022-02-22 RX ADMIN — Medication 10 MILLIGRAM(S): at 16:15

## 2022-02-22 NOTE — OCCUPATIONAL THERAPY INITIAL EVALUATION ADULT - GENERAL OBSERVATIONS, REHAB EVAL
Pt received semi-supine in bed, +tele, +heplock, in NAD and agreeable to OT. Cleared by ALEYDA Barksdale to see.

## 2022-02-22 NOTE — H&P ADULT - HISTORY OF PRESENT ILLNESS
**STROKE HPI***    HPI: 36y Female with PMHx of brain aneurysm, ? CVA, migraines presenting to the ER with two days of migraine. Patient reports she has a history of migraines and with her normal migraine she will take an excedrin and turn the lights off and it will resolve after about two hours. Patient states this migraine has not resolved and today she developed blurred vision with numbness of the right sided as well as weakness. States she has never had neurologic deficits with her usual migraines. Reports she has migraines 3x a week on average. States she took excedrin two days ago with no relief and did not take anything today because of the new numbness. States she was found to have an incidental aneurysm on a imaging she had done as part of her migraine workup and states she was told it was too small to intervene. Describes the headache as a pressure pain pushing down on her head. Also reports having a CVA when she was [age] and has had a chronic facial droop since the stroke. No other medical history and does not take any medications on a daily basis.    PAST MEDICAL & SURGICAL HISTORY:  No pertinent past medical history    History of cholecystectomy        FAMILY HISTORY:  FH: asthma    FH: diabetes mellitus    FH: HTN (hypertension)    FH: hyperlipidemia    FH: breast cancer        SOCIAL HISTORY:   Patient lives with *** at ***.   Smoking status:  Drinking:  Drug Use:     ROS:   Constitutional: No fever, weight loss or fatigue  Eyes: No eye pain, visual disturbances, or discharge  ENMT:  No difficulty hearing, tinnitus, vertigo; No sinus or throat pain  Neck: No pain or stiffness  Respiratory: No cough, wheezing, chills or hemoptysis  Cardiovascular: No chest pain, palpitations, shortness of breath, dizziness or leg swelling  Gastrointestinal: No abdominal pain. No nausea, vomiting or hematemesis; No diarrhea or constipation. Nohematochezia.  Genitourinary: No dysuria, frequency, hematuria or incontinence  Neurological: As per HPI  Skin: No itching, burning, rashes or lesions   Endocrine: No heat or cold intolerance; No hair loss  Musculoskeletal: No joint pain or swelling; No muscle, back or extremity pain  Psychiatric: No depression, anxiety, mood swings or difficulty sleeping  Heme/Lymph: No easy bruising or bleeding gums    T(C): 37 (22 @ 21:34), Max: 37 (22 @ 21:34)  HR: 95 (22 @ 21:34) (95 - 95)  BP: 117/71 (22 @ 21:34) (117/71 - 117/71)  RR: 16 (22 @ 21:34) (16 - 16)  SpO2: 99% (22 @ 21:34) (99% - 99%)    MEDICATION RECONCILIATION   MEDICATIONS  (STANDING):    MEDICATIONS  (PRN):    Allergies    No Known Allergies    Intolerances      Vital Signs Last 24 Hrs  T(C): 37 (2022 21:34), Max: 37 (2022 21:34)  T(F): 98.6 (2022 21:34), Max: 98.6 (2022 21:34)  HR: 95 (2022 21:34) (95 - 95)  BP: 117/71 (2022 21:34) (117/71 - 117/71)  BP(mean): --  RR: 16 (2022 21:34) (16 - 16)  SpO2: 99% (2022 21:34) (99% - 99%)    Physical exam:  General: No acute distress, awake and alert  Cardiovascular: Regular rate and rhythm, no murmurs, rubs, or gallops. No bruits  Pulmonary: Anterior breath sounds clear bilaterally, no crackles or wheezing. No use of accessory muscles  GI: Abdomen soft, non-tender, non-distended    Neurologic:  -Mental status: Awake, alert, oriented to person, place, and time. Speech is fluent with intact naming, repetition, and comprehension, no dysarthria. Recent and remote memory intact. Follows commands. Attention/concentration intact. Fund of knowledge appropriate.  -Cranial nerves:   II: Visual fields are full to confrontation.  III, IV, VI: Extraocular movements are intact without nystagmus. Pupils equally round and reactive to light  V:  Decreased sensation to right sight of face to LT  VII: + +Left facial droop  XI: Head turning and shoulder shrug are intact.  XII: Tongue protrudes midline  Motor: +Pronator drift to RUE  Rapid alternating movements intact and symmetric  Sensation: + Decrease to LT to RUE  Coordination: No dysmetria on finger-to-nose and heel-to-shin bilaterally  Reflexes: Downgoing toes bilaterally   Gait: Narrow gait and steady    NIHSS: **** ASPECT Score: *** ICH Score: *** (GCS)    Fingerstick Blood Glucose: CAPILLARY BLOOD GLUCOSE      POCT Blood Glucose.: 92 mg/dL (2022 22:17)    LABS:                        13.5   10.51 )-----------( 344      ( 2022 22:20 )             42.1         140  |  103  |  17  ----------------------------<  90  4.0   |  25  |  1.06    Ca    9.9      2022 22:20    TPro  7.4  /  Alb  5.0  /  TBili  0.5  /  DBili  x   /  AST  11  /  ALT  6<L>  /  AlkPhos  73      PT/INR - ( 2022 22:20 )   PT: 12.6 sec;   INR: 1.05          PTT - ( 2022 22:20 )  PTT:37.8 sec      Urinalysis Basic - ( 2022 22:21 )    Color: Yellow / Appearance: Clear / S.015 / pH: x  Gluc: x / Ketone: NEGATIVE  / Bili: Negative / Urobili: 0.2 E.U./dL   Blood: x / Protein: NEGATIVE mg/dL / Nitrite: NEGATIVE   Leuk Esterase: NEGATIVE / RBC: x / WBC x   Sq Epi: x / Non Sq Epi: x / Bacteria: x        RADIOLOGY & ADDITIONAL STUDIES:    HCT:     CTA:    -----------------------------------------------------------------------------------------

## 2022-02-22 NOTE — ED PROVIDER NOTE - CLINICAL SUMMARY MEDICAL DECISION MAKING FREE TEXT BOX
37 yo PMHx of migraines, unspecified brain aneurysm with complaints of  R sided heaviness, dizziness, blurred vision (now resolved) that started at 9p last night. Describes assoc numbness to R side, consider CVA/ICH, complex migraine, sciatica, will obtain labs, CTA h/n, neuro/stroke eval.

## 2022-02-22 NOTE — ED PROVIDER NOTE - PHYSICAL EXAMINATION
CONSTITUTIONAL: Awake, alert and in no apparent distress.  HEENT: Head is atraumatic. Eyes clear bilaterally, normal EOMI. Airway patent.  CARDIAC: Normal rate, regular rhythm.  Heart sounds S1, S2.   RESPIRATORY: Breath sounds clear and equal bilaterally. no tachypnea, respiratory distress.   GASTROINTESTINAL: Abdomen soft, non-tender, no guarding, distension.  MUSCULOSKELETAL: Spine appears normal, no midline spinal tenderness, range of motion is not limited, no muscle or joint tenderness. no bony tenderness. drift in RUE/RLE, subjective numbness to right upper and lower ext.  NEUROLOGICAL: Alert, no focal deficits, no motor or sensory deficits.  SKIN: Skin normal color for race, warm, dry and intact. No evidence of rash.  PSYCHIATRIC: Normal mood and affect. no apparent risk to self or others. CONSTITUTIONAL: Awake, alert and in no apparent distress.  HEENT: Head is atraumatic. Eyes clear bilaterally, normal EOMI. Airway patent.  CARDIAC: Normal rate, regular rhythm.  Heart sounds S1, S2.   RESPIRATORY: Breath sounds clear and equal bilaterally. no tachypnea, respiratory distress.   GASTROINTESTINAL: Abdomen soft, non-tender, no guarding, distension.  MUSCULOSKELETAL: Spine appears normal, no midline spinal tenderness, range of motion is not limited, no muscle or joint tenderness. no bony tenderness.   NEUROLOGICAL: Alert, drift in RUE/RLE, subjective numbness to right upper and lower ext. no facial paralysis  SKIN: Skin normal color for race, warm, dry and intact. No evidence of rash.  PSYCHIATRIC: Normal mood and affect. no apparent risk to self or others.

## 2022-02-22 NOTE — OCCUPATIONAL THERAPY INITIAL EVALUATION ADULT - PLANNED THERAPY INTERVENTIONS, OT EVAL
ADL retraining/IADL retraining/balance training/bed mobility training/parent/caregiver training.../ROM/strengthening/transfer training

## 2022-02-22 NOTE — PHYSICAL THERAPY INITIAL EVALUATION ADULT - IMPAIRMENTS FOUND, PT EVAL
aerobic capacity/endurance/gait, locomotion, and balance/gross motor/joint integrity and mobility/muscle strength/neuromotor development and sensory integration

## 2022-02-22 NOTE — OCCUPATIONAL THERAPY INITIAL EVALUATION ADULT - DIAGNOSIS, OT EVAL
Pt presents with decreased strength (R side weaker than left), impaired balance, and decreased activity tolerance impacting overall ease of completing ADLs and mobility at prior level of function.

## 2022-02-22 NOTE — PROGRESS NOTE ADULT - SUBJECTIVE AND OBJECTIVE BOX
Neurology Stroke Progress Note    INTERVAL HPI/OVERNIGHT EVENTS:  Patient seen and examined.     MEDICATIONS  (STANDING):    MEDICATIONS  (PRN):      Allergies    No Known Allergies    Intolerances        Vital Signs Last 24 Hrs  T(C): 36.7 (2022 05:14), Max: 37 (2022 21:34)  T(F): 98 (2022 05:14), Max: 98.6 (2022 21:34)  HR: 84 (2022 05:10) (79 - 95)  BP: 97/53 (2022 05:10) (97/53 - 117/71)  BP(mean): 69 (2022 05:10) (69 - 76)  RR: 18 (2022 05:10) (16 - 18)  SpO2: 95% (2022 05:10) (95% - 99%)    Physical exam:  General: No acute distress, awake and alert  Eyes: Anicteric sclerae, moist conjunctivae, see below for CNs  Neck: trachea midline, FROM, supple, no thyromegaly or lymphadenopathy  Cardiovascular: Regular rate and rhythm, no murmurs, rubs, or gallops. No carotid bruits.   Pulmonary: Anterior breath sounds clear bilaterally, no crackles or wheezing. No use of accessory muscles  GI: Abdomen soft, non-distended, non-tender  Extremities: Radial and DP pulses +2, no edema    Neurologic:  -Mental status: Awake, alert, oriented to person, place, and time. Speech is fluent with intact naming, repetition, and comprehension, no dysarthria. Recent and remote memory intact. Follows commands. Attention/concentration intact. Fund of knowledge appropriate.  -Cranial nerves:   II: Visual fields are full to confrontation.  III, IV, VI: Extraocular movements are intact without nystagmus. Pupils equally round and reactive to light  V:  Facial sensation V1-V3 equal and intact   VII: Face is symmetric with normal eye closure and smile  VIII: Hearing is bilaterally intact to finger rub  IX, X: Uvula is midline and soft palate rises symmetrically  XI: Head turning and shoulder shrug are intact.  XII: Tongue protrudes midline  Motor: Normal bulk and tone. No pronator drift. Strength bilateral upper extremity 5/5, bilateral lower extremities 5/5.  Rapid alternating movements intact and symmetric  Sensation: Intact to light touch bilaterally. No neglect or extinction on double simultaneous testing.  Coordination: No dysmetria on finger-to-nose and heel-to-shin bilaterally  Reflexes: Downgoing toes bilaterally   Gait: Narrow gait and steady    LABS:                        13.5   10.51 )-----------( 344      ( 2022 22:20 )             42.1         139  |  109<H>  |  15  ----------------------------<  95  3.6   |  20<L>  |  0.86    Ca    8.7      2022 06:01    TPro  7.4  /  Alb  5.0  /  TBili  0.5  /  DBili  x   /  AST  11  /  ALT  6<L>  /  AlkPhos  73  02-21    PT/INR - ( 2022 22:20 )   PT: 12.6 sec;   INR: 1.05          PTT - ( 2022 22:20 )  PTT:37.8 sec  Urinalysis Basic - ( 2022 22:21 )    Color: Yellow / Appearance: Clear / S.015 / pH: x  Gluc: x / Ketone: NEGATIVE  / Bili: Negative / Urobili: 0.2 E.U./dL   Blood: x / Protein: NEGATIVE mg/dL / Nitrite: NEGATIVE   Leuk Esterase: NEGATIVE / RBC: x / WBC x   Sq Epi: x / Non Sq Epi: x / Bacteria: x        RADIOLOGY & ADDITIONAL TESTS:     Neurology Stroke Progress Note    INTERVAL HPI/OVERNIGHT EVENTS:  Patient seen and examined. The patient mentioned that her headache started on  morning, which was similar to her usual migraine headache. However, on Monday she started to have blurry vision, slurred speech, and weakness and numbness on the Rt side of the body. She never had similar symptoms. Her headache usually, bilaterally or unilateral, throbbing, range from one hour to a couple of days, associated with photophobia but no phonophobia, nausea or vomiting.   No new issues for today and feels she is better.   No family history of migraine, she mentioned stroke in her grandmother in old age.   She does not have a psychiatric history or a family history of psychiatric problems.   She stated that she had lt facial weakness many years ago, and she was told she had a stroke.     MEDICATIONS  (STANDING):    MEDICATIONS  (PRN):      Allergies    No Known Allergies    Intolerances        Vital Signs Last 24 Hrs  T(C): 36.7 (2022 05:14), Max: 37 (2022 21:34)  T(F): 98 (2022 05:14), Max: 98.6 (2022 21:34)  HR: 84 (2022 05:10) (79 - 95)  BP: 97/53 (2022 05:10) (97/53 - 117/71)  BP(mean): 69 (2022 05:10) (69 - 76)  RR: 18 (2022 05:10) (16 - 18)  SpO2: 95% (2022 05:10) (95% - 99%)    Physical exam:  General: No acute distress, awake and alert  Eyes: Anicteric sclerae, moist conjunctivae, see below for CNs  Neck: trachea midline, FROM, supple, no thyromegaly or lymphadenopathy  Cardiovascular: Regular rate and rhythm, no murmurs, rubs, or gallops. No carotid bruits.   Pulmonary: Anterior breath sounds clear bilaterally, no crackles or wheezing. No use of accessory muscles  GI: Abdomen soft, non-distended, non-tender  Extremities: Radial and DP pulses +2, no edema    Neurologic:  -Mental status: Awake, alert, oriented to person, place, and time. Speech is fluent with intact naming, repetition, and comprehension, no dysarthria. Recent and remote memory intact. Follows commands. Attention/concentration intact. Fund of knowledge appropriate.  -Cranial nerves:   II: Visual fields are full to confrontation.  III, IV, VI: Extraocular movements are intact without nystagmus. Pupils equally round and reactive to light  V:  Facial sensation V1-V3 equal and intact   VII: Face is symmetric with normal eye closure and smile  VIII: Hearing is bilaterally intact to finger rub  IX, X: Uvula is midline and soft palate rises symmetrically  XI: Head turning and shoulder shrug are intact.  XII: Tongue protrudes midline  Motor: Normal bulk and tone. No pronator drift. Strength bilateral upper extremity 5/5, bilateral lower extremities 5/5.  Rapid alternating movements intact and symmetric  Sensation: Intact to light touch bilaterally. No neglect or extinction on double simultaneous testing.  Coordination: No dysmetria on finger-to-nose and heel-to-shin bilaterally  Reflexes: Downgoing toes bilaterally   Gait: Narrow gait and steady    LABS:                        13.5   10.51 )-----------( 344      ( 2022 22:20 )             42.1         139  |  109<H>  |  15  ----------------------------<  95  3.6   |  20<L>  |  0.86    Ca    8.7      2022 06:01    TPro  7.4  /  Alb  5.0  /  TBili  0.5  /  DBili  x   /  AST  11  /  ALT  6<L>  /  AlkPhos  73  02-21    PT/INR - ( 2022 22:20 )   PT: 12.6 sec;   INR: 1.05          PTT - ( 2022 22:20 )  PTT:37.8 sec  Urinalysis Basic - ( 2022 22:21 )    Color: Yellow / Appearance: Clear / S.015 / pH: x  Gluc: x / Ketone: NEGATIVE  / Bili: Negative / Urobili: 0.2 E.U./dL   Blood: x / Protein: NEGATIVE mg/dL / Nitrite: NEGATIVE   Leuk Esterase: NEGATIVE / RBC: x / WBC x   Sq Epi: x / Non Sq Epi: x / Bacteria: x        RADIOLOGY & ADDITIONAL TESTS:     Neurology Stroke Progress Note    INTERVAL HPI/OVERNIGHT EVENTS:  Patient seen and examined. The patient mentioned that her headache started on  morning, which was similar to her usual migraine headache. However, on Monday she started to have blurry vision, slurred speech, and weakness and numbness on the Rt side of the body. She never had similar symptoms. Her headache usually, bilaterally or unilateral, throbbing, range from one hour to a couple of days, associated with photophobia but no phonophobia, nausea or vomiting.   No new issues for today and feels she is better.   No family history of migraine, she mentioned stroke in her grandmother in old age.   She does not have a psychiatric history or a family history of psychiatric problems.   She stated that she had lt facial weakness many years ago, and she was told she had a stroke.     MEDICATIONS  (STANDING):    MEDICATIONS  (PRN):      Allergies    No Known Allergies    Intolerances        Vital Signs Last 24 Hrs  T(C): 36.7 (2022 05:14), Max: 37 (2022 21:34)  T(F): 98 (2022 05:14), Max: 98.6 (2022 21:34)  HR: 84 (2022 05:10) (79 - 95)  BP: 97/53 (2022 05:10) (97/53 - 117/71)  BP(mean): 69 (2022 05:10) (69 - 76)  RR: 18 (2022 05:10) (16 - 18)  SpO2: 95% (2022 05:10) (95% - 99%)    Physical exam:  General: No acute distress, awake and alert  Eyes: Anicteric sclerae, moist conjunctivae, see below for CNs  Neck: trachea midline, FROM, supple, no thyromegaly or lymphadenopathy  Cardiovascular: Regular rate and rhythm, no murmurs, rubs, or gallops. No carotid bruits.   Pulmonary: Anterior breath sounds clear bilaterally, no crackles or wheezing. No use of accessory muscles  GI: Abdomen soft, non-distended, non-tender  Extremities: Radial and DP pulses +2, no edema    Neurologic:  -Mental status: Awake, alert, oriented to person, place, and time. Speech is fluent with intact naming, repetition, and comprehension, no dysarthria. Recent and remote memory intact. Follows commands. Attention/concentration intact. Fund of knowledge appropriate.  -Cranial nerves:   II: Visual fields are full to confrontation.  III, IV, VI: Extraocular movements are intact without nystagmus. Pupils equally round and reactive to light  V:  Facial sensation decreased on the Lt side of the face.  VII: Effacement of the Rt nasolabial fold  VIII: Hearing is bilaterally intact to finger rub  IX, X: Uvula is midline and soft palate rises symmetrically  XI: Head turning and shoulder shrug are intact.  XII: Tongue protrudes midline  Motor: Normal bulk and tone. No pronator drift. Strength bilateral upper extremity 5/5, bilateral lower extremities 5/5.  Rapid alternating movements intact and symmetric  Sensation: Sensory decrease on the Rt side of the body  Coordination: No dysmetria on finger-to-nose and heel-to-shin bilaterally  Reflexes: Downgoing toes bilaterally, +2 reflexes all over.   Gait: Narrow gait and steady    LABS:                        13.5   10.51 )-----------( 344      ( 2022 22:20 )             42.1     -    139  |  109<H>  |  15  ----------------------------<  95  3.6   |  20<L>  |  0.86    Ca    8.7      2022 06:01    TPro  7.4  /  Alb  5.0  /  TBili  0.5  /  DBili  x   /  AST  11  /  ALT  6<L>  /  AlkPhos  73  02-21    PT/INR - ( 2022 22:20 )   PT: 12.6 sec;   INR: 1.05          PTT - ( 2022 22:20 )  PTT:37.8 sec  Urinalysis Basic - ( 2022 22:21 )    Color: Yellow / Appearance: Clear / S.015 / pH: x  Gluc: x / Ketone: NEGATIVE  / Bili: Negative / Urobili: 0.2 E.U./dL   Blood: x / Protein: NEGATIVE mg/dL / Nitrite: NEGATIVE   Leuk Esterase: NEGATIVE / RBC: x / WBC x   Sq Epi: x / Non Sq Epi: x / Bacteria: x        RADIOLOGY & ADDITIONAL TESTS:

## 2022-02-22 NOTE — OCCUPATIONAL THERAPY INITIAL EVALUATION ADULT - PERTINENT HX OF CURRENT PROBLEM, REHAB EVAL
37 y/o F with PMHx ?CVA, migraine, brain aneurysm presenting with migraine with symptoms of RUE numbness, weakness and blurred vision. Concern for stroke versus complex migraine. Will admit for further workup to rule out stroke.  CTH negative for acute hemorrhage

## 2022-02-22 NOTE — H&P ADULT - ASSESSMENT
37 y/o F with PMHx ?CVA, migraine, brain aneurysm presenting with migraine with symptoms of RUE numbness, weakness and blurred vision. Concern for stroke versus complex migraine. Will admit for further workup to rule out stroke.  CTH negative for acute hemorrhage.     -Admit to [facility] for further workup  -Will obtain MRI in am    Discussed with Dr. Pickett

## 2022-02-22 NOTE — CONSULT NOTE ADULT - ASSESSMENT
This is a 37yo woman with a PMH of brain aneurysm, Bell's Palsy and migraines who p/w two days of migraines associated with waxing and waning blurry vision and R-sided "heaviness".  Currently undergoing CVA r/o.     #CVA R/O  -- MRI and TTE pending   -- defer ASA and statin to primary team; currently no ordered     #Complex Migraine  -- on the differential   -- no outpt specialist w/whom she follows; would benefit from referral     #Diet - Regular  #DVT PPx - Ambulatory  #Dispo - Home once medically cleared     Rehana Bagley  Attending Hospitalist  [phone]

## 2022-02-22 NOTE — OCCUPATIONAL THERAPY INITIAL EVALUATION ADULT - MODALITIES TREATMENT COMMENTS
Cranial Nerves II - XII: II: PERRLA; visual fields are full to confrontation III, IV, VI: EOMI, no nystagmus appreciated V: facial sensation intact to light touch V1-V3 b/l VII: no ptosis, +L side facial droop (at baseline from previous CVA), symmetric eyebrow raise and eye closure VIII: hearing intact to finger rub b/l  XI: head turning and shoulder shrug intact b/l XII: tongue protrusion midline

## 2022-02-22 NOTE — PATIENT PROFILE ADULT - FALL HARM RISK - HARM RISK INTERVENTIONS

## 2022-02-22 NOTE — OCCUPATIONAL THERAPY INITIAL EVALUATION ADULT - ADDITIONAL COMMENTS
pt lives w/ her mom in an elevator access apt building w/ 7 steps to enter. Denies use of DME for ambulation prior to this admission. Denies hx of recent falls. States that she was independent in all ADLs/IADLs prior to admit.

## 2022-02-22 NOTE — ED PROVIDER NOTE - OBJECTIVE STATEMENT
35 yo PMHx of migraines, unspecified brain aneurysm with complaints of  R sided heaviness, dizziness, blurred vision (now resolved) that started at 9p last night. Describes assoc numbness to R side, no speech changes, facial weakness/numbness, saddle anesthesia. States migraines do not present as such.

## 2022-02-22 NOTE — PROGRESS NOTE ADULT - ASSESSMENT
This 36y old female, has PMH of migraine, and presented with headache and Rt arm numbness and weakness. The patient headache is more prolonged than usual for her migraine attack, and her migraine is not usually associated with any aura or similar previous symptoms.   It is possible that she is having a complicated migraine in association of status migrainus.   CTH showed small hypogenicities in the white matter including the Lt external capsule, with normal CTA. The association of migraine with these findings may suggest CADASIL as a possible DDX    Neuro  #CVA workup  - continue aspirin 81mg and plavix 75mg daily  - continue atorvastatin 80mg daily  - q4hr stroke neuro checks and vitals  - obtain MRI Brain without contrast  - Stroke Code HCT Results:   - Stroke Code CTA Results:  - Stroke education    Cards  #HTN  - permissive hypertension, Goal -180  - hold home blood pressure medication for now  - obtain TTE with bubble  - Stroke Code EKG Results:    #HLD  - high dose statin as above in CVA  - LDL results:     Pulm  - call provider if SPO2 < 94%    GI  #Nutrition/Fluids/Electrolytes   - replete K<4 and Mg <2  - Diet:  - IVF:     Renal  -     Infectious Disease  - Stroke Code CXR results:     Endocrine  #DM  - A1C results:   - ISS    - TSH results:    DVT Prophylaxis  - lovenox sq for DVT prophylaxis   - SCDs for DVT prophylaxis       IDR Goals: Goals reviewed at interdisciplinary rounds with case management, social work, physical therapy, occupational therapy, and speech language pathology.   Please see specific therapy  notes for in depth goals.  Dispo: **********(Acute rehab - can tolerate 3 hours of therapy)     Discussed daily hospital plans and goals with patient and family at bedside. (Called and updated family.)    Discussed with Neurology Attending This 36y old female, has PMH of migraine, and presented with headache and Rt arm numbness and weakness. The patient headache is more prolonged than usual for her migraine attack, and her migraine is not usually associated with any aura or similar previous symptoms.   It is possible that she is having a complicated migraine in association of status migrainus.   CTH showed small hypogenicities in the white matter including the Lt external capsule, with normal CTA. The association of migraine with these findings may suggest CADASIL as a possible DDX    Neuro  #CVA workup  - continue aspirin 81mg and plavix 75mg daily  - continue atorvastatin 80mg daily  - q4hr stroke neuro checks and vitals  - obtain MRI Brain without contrast  - Stroke Code HCT Results: Is showed small hypogenicities in different areas include the Lt external capsul  - Stroke Code CTA Results: No significant  - Stroke education    Cards  #HTN  - permissive hypertension, Goal -180  - hold home blood pressure medication for now  - obtain TTE with bubble  - Stroke Code EKG Results: Sinus rhythm    #HLD  - high dose statin as above in CVA  - LDL results: pending     Pulm  - call provider if SPO2 < 94%    GI  #Nutrition/Fluids/Electrolytes   - replete K<4 and Mg <2  - Diet: regular   - IVF: None     Renal  - daily BMP     Infectious Disease  - Stroke Code CXR results: Not significant     Endocrine  #  - A1C results: pending     - TSH results: pending     DVT Prophylaxis  - lovenox sq for DVT prophylaxis   - SCDs for DVT prophylaxis       IDR Goals: Goals reviewed at interdisciplinary rounds with case management, social work, physical therapy, occupational therapy, and speech language pathology.   Please see specific therapy  notes for in depth goals.  Dispo: home      Discussed daily hospital plans and goals with patient and family at bedside. (Called and updated family.)    Discussed with Neurology Attending This 36y old female, has PMH of migraine, and presented with headache and Rt arm numbness and weakness. The patient headache is more prolonged than usual for her migraine attack, and her migraine is not usually associated with any aura or similar previous symptoms.   It is possible that she is having a complicated migraine in association of status migrainus.   CTH showed small hypogenicities in the white matter including the Lt external capsule, with normal CTA. The association of migraine with these findings may suggest CADASIL as a possible DDX    Neuro  #CVA workup  - continue aspirin 81mg and plavix 75mg daily  - continue atorvastatin 80mg daily  - q4hr stroke neuro checks and vitals  - obtain MRI Brain without contrast  - Stroke Code HCT Results: Is showed small hypogenicities in different areas include the Lt external capsul  - Stroke Code CTA Results: No significant  - Stroke education    #Headache  The symptoms are consistent with migraine   She stated that Tylenol, Motrin, Excedrin don't work for her headache   - Headache cocktail of [ 1000mg Valproate IV, 1000mg Mg Iv, 10mg Metoclopramide IV]  - Start on Topiramate 25mg daily for one week to be increased 25mg to reach the target dose of 50mg bid   [propranolol would not be a good option as her BP was 97 at some point, Amitriptyline would not be a good option as she is taking Escitalopram for her depression, and associating these two medications together may increase the risk of serotonin syndrome]    Cards  #HTN  - permissive hypertension, Goal -180  - hold home blood pressure medication for now  - obtain TTE with bubble  - Stroke Code EKG Results: Sinus rhythm    #HLD  - high dose statin as above in CVA  - LDL results: pending     Pulm  - call provider if SPO2 < 94%    GI  #Nutrition/Fluids/Electrolytes   - replete K<4 and Mg <2  - Diet: regular   - IVF: None     Renal  - daily BMP     Infectious Disease  - Stroke Code CXR results: Not significant     Endocrine  #  - A1C results: pending     - TSH results: pending     DVT Prophylaxis  - lovenox sq for DVT prophylaxis   - SCDs for DVT prophylaxis       IDR Goals: Goals reviewed at interdisciplinary rounds with case management, social work, physical therapy, occupational therapy, and speech language pathology.   Please see specific therapy  notes for in depth goals.  Dispo: home      Discussed daily hospital plans and goals with patient and family at bedside. (Called and updated family.)    Discussed with Neurology Attending

## 2022-02-22 NOTE — PHYSICAL THERAPY INITIAL EVALUATION ADULT - ADDITIONAL COMMENTS
pt lives w/ her mom in an elevator access apt building w/ 7 steps to enter. Denies use of DME for ambulation prior to this admission. Denies hx of recent falls. States that she was independent in all ADLs prior to this admission

## 2022-02-22 NOTE — PHYSICAL THERAPY INITIAL EVALUATION ADULT - MODALITIES TREATMENT COMMENTS
smile, cheek puff, eyebrow raise: L sided droop/impairment 2/2 previous CVA. finger to nose: WNL B/L, visual tracking (h test): smooth pursuit, visual field test (quadrant test): WNL B/L. no DDK noted

## 2022-02-22 NOTE — PHYSICAL THERAPY INITIAL EVALUATION ADULT - GAIT DEVIATIONS NOTED, PT EVAL
decreased mihaela/increased time in double stance/decreased step length/decreased weight-shifting ability

## 2022-02-22 NOTE — CONSULT NOTE ADULT - SUBJECTIVE AND OBJECTIVE BOX
Stroke Co-Management Initial Consult Note    HPI:  This is a 35yo woman with a PMH of brain aneurysm, Bell's Palsy and migraines who p/w two days of migraines associated with waxing and waning blurry vision and R-sided "heaviness".  She experiences migraines monthly, and they can last up to 3 days.  She sometimes gets relief with Excedrin.  As a child she was diagnosed with Bell's Palsy and has residual L-sided deficits that never corrected.  On ROS this AM, she reports mild improvement in her symptoms but nonetheless still has some changes in her vision and weakness on her R side.     PAST MEDICAL & SURGICAL HISTORY:  Brain aneurysm  Bell's Palsy  Migraines   History of cholecystectomy    FAMILY HISTORY:  FH: asthma  FH: breast cancer  FH: diabetes mellitus  FH: HTN (hypertension)  FH: hyperlipidemia.    SOCIAL HISTORY:   Previous .   Social EtOH but no tobacco or recreational drugs.   Independent with ADL's and iADL's.    Home Medications:   Excedrin OTC PRN     Allergies: No Known Allergies    CURRENT MEDICATIONS:   None    VITAL SIGNS, INS/OUTS (last 24 hours):  Vital Signs Last 24 Hrs  T(C): 36.8 (22 Feb 2022 10:00), Max: 37 (21 Feb 2022 21:34)  T(F): 98.3 (22 Feb 2022 10:00), Max: 98.6 (21 Feb 2022 21:34)  HR: 78 (22 Feb 2022 12:20) (62 - 95)  BP: 105/63 (22 Feb 2022 12:20) (97/53 - 117/71)  BP(mean): 78 (22 Feb 2022 12:20) (69 - 78)  RR: 18 (22 Feb 2022 12:20) (16 - 18)  SpO2: 100% (22 Feb 2022 12:20) (95% - 100%)  I&O's Summary    EXAM:  Gen: NAD, sitting upright in bed  HEENT: NCAT, MMM, clear OP  Neck: supple, trachea at midline  CV: RRR, no m/g/r appreciated  Pulm: CTA B, no w/r/r; no increase in WOB  Abd: normoactive BS, soft, NTND  Ext: WWP, 2+ pulses x4; no c/c/e  Neuro: AOx3, L-sided facial paralysis, no dysarthria   Psych: pleasant, conversant and appropriate     BASIC LABS:                        13.5   10.51 )-----------( 344      ( 21 Feb 2022 22:20 )             42.1     02-22    139  |  109<H>  |  15  ----------------------------<  95  3.6   |  20<L>  |  0.86    Ca    8.7      22 Feb 2022 06:01    CAPILLARY BLOOD GLUCOSE  POCT Blood Glucose.: 92 mg/dL (21 Feb 2022 22:17)    MICRODATA:  No new microdata.     IMAGING:  No new imaging.

## 2022-02-22 NOTE — OCCUPATIONAL THERAPY INITIAL EVALUATION ADULT - MODIFIED CLINICAL TEST OF SENSORY INTEGRATION IN BALANCE TEST
Pt able to ambulate ~150' with CGA using no AD, with pt reporting diminished sensation and decreased step length in RLE

## 2022-02-22 NOTE — PHYSICAL THERAPY INITIAL EVALUATION ADULT - PERTINENT HX OF CURRENT PROBLEM, REHAB EVAL
37 y/o F with PMHx ?CVA, migraine, brain aneurysm presenting with migraine with symptoms of RUE numbness, weakness and blurred vision. Concern for stroke versus complex migraine. Will admit for further workup to rule out stroke.  CTH negative for acute hemorrhage.

## 2022-02-23 VITALS
DIASTOLIC BLOOD PRESSURE: 55 MMHG | HEART RATE: 82 BPM | SYSTOLIC BLOOD PRESSURE: 109 MMHG | OXYGEN SATURATION: 94 % | RESPIRATION RATE: 18 BRPM

## 2022-02-23 LAB
ALBUMIN SERPL ELPH-MCNC: 4.2 G/DL — SIGNIFICANT CHANGE UP (ref 3.3–5)
ALP SERPL-CCNC: 64 U/L — SIGNIFICANT CHANGE UP (ref 40–120)
ALT FLD-CCNC: 7 U/L — LOW (ref 10–45)
ANION GAP SERPL CALC-SCNC: 10 MMOL/L — SIGNIFICANT CHANGE UP (ref 5–17)
AST SERPL-CCNC: 9 U/L — LOW (ref 10–40)
BILIRUB SERPL-MCNC: 0.5 MG/DL — SIGNIFICANT CHANGE UP (ref 0.2–1.2)
BUN SERPL-MCNC: 12 MG/DL — SIGNIFICANT CHANGE UP (ref 7–23)
CALCIUM SERPL-MCNC: 9.2 MG/DL — SIGNIFICANT CHANGE UP (ref 8.4–10.5)
CHLORIDE SERPL-SCNC: 108 MMOL/L — SIGNIFICANT CHANGE UP (ref 96–108)
CO2 SERPL-SCNC: 20 MMOL/L — LOW (ref 22–31)
CREAT SERPL-MCNC: 0.71 MG/DL — SIGNIFICANT CHANGE UP (ref 0.5–1.3)
GLUCOSE SERPL-MCNC: 92 MG/DL — SIGNIFICANT CHANGE UP (ref 70–99)
HCT VFR BLD CALC: 40 % — SIGNIFICANT CHANGE UP (ref 34.5–45)
HGB BLD-MCNC: 12.9 G/DL — SIGNIFICANT CHANGE UP (ref 11.5–15.5)
MAGNESIUM SERPL-MCNC: 2.1 MG/DL — SIGNIFICANT CHANGE UP (ref 1.6–2.6)
MCHC RBC-ENTMCNC: 28.6 PG — SIGNIFICANT CHANGE UP (ref 27–34)
MCHC RBC-ENTMCNC: 32.3 GM/DL — SIGNIFICANT CHANGE UP (ref 32–36)
MCV RBC AUTO: 88.7 FL — SIGNIFICANT CHANGE UP (ref 80–100)
NRBC # BLD: 0 /100 WBCS — SIGNIFICANT CHANGE UP (ref 0–0)
PHOSPHATE SERPL-MCNC: 3.3 MG/DL — SIGNIFICANT CHANGE UP (ref 2.5–4.5)
PLATELET # BLD AUTO: 307 K/UL — SIGNIFICANT CHANGE UP (ref 150–400)
POTASSIUM SERPL-MCNC: 4.1 MMOL/L — SIGNIFICANT CHANGE UP (ref 3.5–5.3)
POTASSIUM SERPL-SCNC: 4.1 MMOL/L — SIGNIFICANT CHANGE UP (ref 3.5–5.3)
PROT SERPL-MCNC: 6.7 G/DL — SIGNIFICANT CHANGE UP (ref 6–8.3)
RBC # BLD: 4.51 M/UL — SIGNIFICANT CHANGE UP (ref 3.8–5.2)
RBC # FLD: 12.2 % — SIGNIFICANT CHANGE UP (ref 10.3–14.5)
SODIUM SERPL-SCNC: 138 MMOL/L — SIGNIFICANT CHANGE UP (ref 135–145)
WBC # BLD: 9.32 K/UL — SIGNIFICANT CHANGE UP (ref 3.8–10.5)
WBC # FLD AUTO: 9.32 K/UL — SIGNIFICANT CHANGE UP (ref 3.8–10.5)

## 2022-02-23 PROCEDURE — 70498 CT ANGIOGRAPHY NECK: CPT | Mod: MA

## 2022-02-23 PROCEDURE — 70496 CT ANGIOGRAPHY HEAD: CPT | Mod: MA

## 2022-02-23 PROCEDURE — 81003 URINALYSIS AUTO W/O SCOPE: CPT

## 2022-02-23 PROCEDURE — 99233 SBSQ HOSP IP/OBS HIGH 50: CPT

## 2022-02-23 PROCEDURE — 83036 HEMOGLOBIN GLYCOSYLATED A1C: CPT

## 2022-02-23 PROCEDURE — 70450 CT HEAD/BRAIN W/O DYE: CPT | Mod: MA

## 2022-02-23 PROCEDURE — 85027 COMPLETE CBC AUTOMATED: CPT

## 2022-02-23 PROCEDURE — 85730 THROMBOPLASTIN TIME PARTIAL: CPT

## 2022-02-23 PROCEDURE — 99239 HOSP IP/OBS DSCHRG MGMT >30: CPT

## 2022-02-23 PROCEDURE — 97162 PT EVAL MOD COMPLEX 30 MIN: CPT

## 2022-02-23 PROCEDURE — 80048 BASIC METABOLIC PNL TOTAL CA: CPT

## 2022-02-23 PROCEDURE — 83735 ASSAY OF MAGNESIUM: CPT

## 2022-02-23 PROCEDURE — 84702 CHORIONIC GONADOTROPIN TEST: CPT

## 2022-02-23 PROCEDURE — 97161 PT EVAL LOW COMPLEX 20 MIN: CPT

## 2022-02-23 PROCEDURE — 80061 LIPID PANEL: CPT

## 2022-02-23 PROCEDURE — 84100 ASSAY OF PHOSPHORUS: CPT

## 2022-02-23 PROCEDURE — 85025 COMPLETE CBC W/AUTO DIFF WBC: CPT

## 2022-02-23 PROCEDURE — 85610 PROTHROMBIN TIME: CPT

## 2022-02-23 PROCEDURE — 99285 EMERGENCY DEPT VISIT HI MDM: CPT

## 2022-02-23 PROCEDURE — 87635 SARS-COV-2 COVID-19 AMP PRB: CPT

## 2022-02-23 PROCEDURE — 82962 GLUCOSE BLOOD TEST: CPT

## 2022-02-23 PROCEDURE — 80053 COMPREHEN METABOLIC PANEL: CPT

## 2022-02-23 PROCEDURE — 96374 THER/PROPH/DIAG INJ IV PUSH: CPT | Mod: XU

## 2022-02-23 PROCEDURE — 70551 MRI BRAIN STEM W/O DYE: CPT | Mod: MH

## 2022-02-23 PROCEDURE — 36415 COLL VENOUS BLD VENIPUNCTURE: CPT

## 2022-02-23 RX ORDER — TOPIRAMATE 25 MG
1 TABLET ORAL
Qty: 60 | Refills: 0
Start: 2022-02-23 | End: 2022-04-23

## 2022-02-23 RX ADMIN — Medication 25 MILLIGRAM(S): at 11:35

## 2022-02-23 NOTE — DISCHARGE NOTE PROVIDER - CARE PROVIDER_API CALL
Liliam Vivas)  Neurology  [address] [address]  Phone: [phone]  Fax: [phone]  Follow Up Time: 2 weeks

## 2022-02-23 NOTE — DISCHARGE NOTE NURSING/CASE MANAGEMENT/SOCIAL WORK - PATIENT PORTAL LINK FT
You can access the FollowMyHealth Patient Portal offered by [facility] by registering at the following website: [email or web address]. By joining [facility]’s FollowMyHealth portal, you will also be able to view your health information using other applications (apps) compatible with our system.

## 2022-02-23 NOTE — MEDICAL STUDENT PROGRESS NOTE(EDUCATION) - NS MD HP STUD ASPLAN PLAN FT
-Discharge today  -Start patient on topiramate for migraine prophylaxis  -f/u with Neurology outpatient and try to obtain medical records from admission for Bell's Palsy to make sure there was no CVA at the time that warrants further workup

## 2022-02-23 NOTE — MEDICAL STUDENT PROGRESS NOTE(EDUCATION) - NS MD HP STUD ASPLAN ASSES FT
Patient is a 36 year old female with PMH migraine headaches (3x/week) that resolve with Excedrin and rest in a dark room and left sided Bell's Palsy 20 years ago with residual deficits, presenting with bilateral migraine headache for two days complicated by blurry vision, dysarthria and right sided weakness. Patient states symptoms have now resolved. On physical exam, there decreased strength of left sided facial muscles consistent with previous Bell's Palsy. Patient also reports decreased sensation on left side of face. MRI did not show any focal areas of infarction.

## 2022-02-23 NOTE — DISCHARGE NOTE NURSING/CASE MANAGEMENT/SOCIAL WORK - NSDCPEFALRISK_GEN_ALL_CORE
For information on Fall & Injury Prevention, visit: [email or web address] OR  [email or web address] OR  https://www.cdc.gov/steadi/patient.html

## 2022-02-23 NOTE — DISCHARGE NOTE PROVIDER - NSDCCPCAREPLAN_GEN_ALL_CORE_FT
PRINCIPAL DISCHARGE DIAGNOSIS  Diagnosis: Complicated migraine  Assessment and Plan of Treatment: Migraines are a kind of headache that can also involve other symptoms. Migraines can affect both adults and children. They are more common in women than in men. Migraines often start off mild and then get worse.  Symptoms can include:  ?Headache – The headache gets worse over several hours and is usually throbbing. It often affects 1 side of the head.  ?Nausea and sometimes vomiting  ?Feeling sensitive to light and noise – Lying down in a quiet, dark room often helps.  ?Aura – Some people have something called a migraine "aura." An aura is a symptom or feeling that happens before or during the migraine headache. Each person's aura is different, but in most cases the aura affects the vision. You might see flashing lights, bright spots, or zig-zag lines, or lose part of your vision. Or you might have numbness and tingling of the lips, lower face, and fingers of 1 hand. Some people hear sounds or have ringing in their ears as part of their aura. The aura usually lasts a few minutes to an hour and then goes away, but most often lasts 15 to 30 minutes.  Women who get migraines with aura usually cannot take birth control pills. That's because they might increase the risk of stroke.  Many people get other symptoms of migraine that happen several hours or even a day before the headache. Doctors call these "premonitory" or "prodromal" symptoms. They might include yawning, feeling depressed, irritability, food cravings, constipation, or a stiff neck.  Common migraine triggers include:  ?Stress  ?Hormonal changes  ?Skipping meals or not eating enough  ?Changes in the weather  ?Sleeping too much or too little  ?Bright or flashing lights  ?Drinking alcohol  ?Certain drinks or foods, such as red wine, aged cheese, and hot dogs  For treatment please take the prescribed medication

## 2022-02-23 NOTE — PROGRESS NOTE ADULT - ASSESSMENT
This 36y old female, has PMH of migraine, and presented with headache and Rt arm numbness and weakness. The patient headache is more prolonged than usual for her migraine attack, and her migraine is not usually associated with any aura or similar previous symptoms.   It is possible that she is having a complicated migraine in association of status migrainus.   CTH showed small hypogenicities in the white matter including the Lt external capsule, with normal CTA. The association of migraine with these findings may suggest CADASIL as a possible DDX  MRI was done and showed no acute stroke     Neuro  #CVA workup  - continue aspirin 81mg and plavix 75mg daily  - continue atorvastatin 80mg daily  - q4hr stroke neuro checks and vitals  - MRI Brain without contrast: no acute stroke   - Stroke Code HCT Results: Is showed small hypogenicities in different areas include the Lt external capsule (most likely Virchow Zeyad's spaces, as MR negative for ischemia)   - Stroke Code CTA Results: No significant  - Stroke education    #Headache  The symptoms are consistent with migraine   She stated that Tylenol, Motrin, Excedrin don't work for her headache   - Headache cocktail of [ 1000mg Valproate IV, 1000mg Mg Iv, 10mg Metoclopramide IV] if needed   - Start on Topiramate 25mg daily for one week to be increased 25mg to reach the target dose of 50mg bid   [propranolol would not be a good option as her BP was 97 at some point, Amitriptyline would not be a good option as she is taking Escitalopram for her depression, and associating these two medications together may increase the risk of serotonin syndrome]    Cards  #HTN  - Normal BP  - obtain TTE with bubble  - Stroke Code EKG Results: Sinus rhythm    #HLD  - high dose statin as above in CVA  - LDL results: pending     Pulm  - call provider if SPO2 < 94%    GI  #Nutrition/Fluids/Electrolytes   - replete K<4 and Mg <2  - Diet: regular   - IVF: None     Renal  - daily BMP     Infectious Disease  - Stroke Code CXR results: Not significant     Endocrine  #  - A1C results: 5    - TSH results: pending     DVT Prophylaxis  - lovenox sq for DVT prophylaxis   - SCDs for DVT prophylaxis       IDR Goals: Goals reviewed at interdisciplinary rounds with case management, social work, physical therapy, occupational therapy, and speech language pathology.   Please see specific therapy  notes for in depth goals.  Dispo: home      Discussed daily hospital plans and goals with patient and family at bedside. (Called and updated family.)    Discussed with Neurology Attending

## 2022-02-23 NOTE — PROGRESS NOTE ADULT - SUBJECTIVE AND OBJECTIVE BOX
Neurology Stroke Progress Note    INTERVAL HPI/OVERNIGHT EVENTS:  Patient seen and examined. No issues over night. No current complains.     MEDICATIONS  (STANDING):  aspirin  chewable 81 milliGRAM(s) Oral daily  atorvastatin 80 milliGRAM(s) Oral at bedtime  clopidogrel Tablet 75 milliGRAM(s) Oral daily  enoxaparin Injectable 40 milliGRAM(s) SubCutaneous at bedtime  topiramate 25 milliGRAM(s) Oral daily    MEDICATIONS  (PRN):      Allergies    No Known Allergies    Intolerances      Vital Signs Last 24 Hrs  T(C): 36.9 (23 Feb 2022 06:21), Max: 36.9 (22 Feb 2022 22:00)  T(F): 98.4 (23 Feb 2022 06:21), Max: 98.4 (22 Feb 2022 22:00)  HR: 74 (23 Feb 2022 08:40) (62 - 114)  BP: 97/54 (23 Feb 2022 08:40) (97/54 - 111/53)  BP(mean): 72 (23 Feb 2022 08:40) (72 - 81)  RR: 18 (23 Feb 2022 08:40) (18 - 18)  SpO2: 93% (23 Feb 2022 08:40) (74% - 100%)    Physical exam:  General: No acute distress, awake and alert  Eyes: Anicteric sclerae, moist conjunctivae, see below for CNs  Neck: trachea midline, FROM, supple, no thyromegaly or lymphadenopathy  Cardiovascular: Regular rate and rhythm, no murmurs, rubs, or gallops. No carotid bruits.   Pulmonary: Anterior breath sounds clear bilaterally, no crackles or wheezing. No use of accessory muscles  GI: Abdomen soft, non-distended, non-tender  Extremities: Radial and DP pulses +2, no edema    Neurologic:  -Mental status: Awake, alert, oriented to person, place, and time. Speech is fluent with intact naming, repetition, and comprehension, no dysarthria. Recent and remote memory intact. Follows commands. Attention/concentration intact. Fund of knowledge appropriate.  -Cranial nerves:   II: Visual fields are full to confrontation.  III, IV, VI: Extraocular movements are intact without nystagmus. Pupils equally round and reactive to light  V:  Facial sensation decreased on the Lt side of the face.  VII: Effacement of the Rt nasolabial fold  VIII: Hearing is bilaterally intact to finger rub  IX, X: Uvula is midline and soft palate rises symmetrically  XI: Head turning and shoulder shrug are intact.  XII: Tongue protrudes midline  Motor: Normal bulk and tone. No pronator drift. Strength bilateral upper extremity 5/5, bilateral lower extremities 5/5.  Rapid alternating movements intact and symmetric  Sensation: Sensory decrease on the Rt side of the body  Coordination: No dysmetria on finger-to-nose and heel-to-shin bilaterally  Reflexes: Downgoing toes bilaterally, +2 reflexes all over.   Gait: Narrow gait and steady      LABS:  cret                        12.9   9.32  )-----------( 307      ( 23 Feb 2022 05:51 )             40.0     02-23    138  |  108  |  12  ----------------------------<  92  4.1   |  20<L>  |  0.71    Ca    9.2      23 Feb 2022 05:51  Phos  3.3     02-23  Mg     2.1     02-23    TPro  6.7  /  Alb  4.2  /  TBili  0.5  /  DBili  x   /  AST  9<L>  /  ALT  7<L>  /  AlkPhos  64  02-23    PT/INR - ( 21 Feb 2022 22:20 )   PT: 12.6 sec;   INR: 1.05          PTT - ( 21 Feb 2022 22:20 )  PTT:37.8 sec        RADIOLOGY & ADDITIONAL TESTS:

## 2022-02-23 NOTE — PROGRESS NOTE ADULT - ATTENDING COMMENTS
The patient is a very pleasant 36-year-old female with a history of migraine headaches, left Bell's palsy (though she subjectively also describes facial numbness), and outside diagnosis of possible small cerebral aneurysm (not confirmed on CTA here).  She presented to the ED with a headache similar to her prior migraines in terms of character/severity but different in the sense that she also had blurred vision, difficulty speaking, and right-sided weakness/numbness.  Symptoms were of somewhat gradual onset over the course of a few days. MRI was negative. Suspect complex migraine. Continue topiramate with PRN naproxen. Avoid triptans. Will defer further stroke workup for now. Given history of Bell's Palsy with facial numbness and inconsistent history of prior stroke, patient will bring records from when she developed the facial symptoms years ago to her f/u appt to determine if further stroke workup is necessary.
The patient is a very pleasant 36-year-old female with a history of migraine headaches, left Bell's palsy, and outside diagnosis of possible small cerebral aneurysm.  She presented to the ED with a headache similar to her prior migraines in terms of character/severity but different in the sense that she also had blurred vision, difficulty speaking, and right-sided weakness/numbness.  Symptoms were of somewhat gradual onset over the course of a few days.  Initial imaging was unrevealing.  Differential includes complex migraine versus stroke. MRI brain pending.  Further evaluation pending results.

## 2022-02-23 NOTE — PROGRESS NOTE ADULT - SUBJECTIVE AND OBJECTIVE BOX
INTERVAL EVENTS:  -- MRI negative     SUBJECTIVE:  -- reports no additional episodes of decreased vision  -- HA has resolved, as has "heaviness"  -- ambulating w/o difficulty; eating and swallowing w/o difficulty   -- Review of Systems: 12 point review of systems otherwise negative    MEDICATIONS:  MEDICATIONS  (STANDING):  enoxaparin Injectable 40 milliGRAM(s) SubCutaneous at bedtime  topiramate 25 milliGRAM(s) Oral daily    MEDICATIONS  (PRN):    Allergies  No Known Allergies    OBJECTIVE:  Vital Signs Last 24 Hrs  T(C): 36.8 (23 Feb 2022 10:00), Max: 36.9 (22 Feb 2022 22:00)  T(F): 98.3 (23 Feb 2022 10:00), Max: 98.4 (22 Feb 2022 22:00)  HR: 74 (23 Feb 2022 08:40) (62 - 114)  BP: 97/54 (23 Feb 2022 08:40) (97/54 - 111/53)  BP(mean): 72 (23 Feb 2022 08:40) (72 - 81)  RR: 18 (23 Feb 2022 08:40) (18 - 18)  SpO2: 93% (23 Feb 2022 08:40) (74% - 96%)  I&O's Summary    22 Feb 2022 07:01  -  23 Feb 2022 07:00  --------------------------------------------------------  IN: 150 mL / OUT: 0 mL / NET: 150 mL    PHYSICAL EXAM:  Gen: NAD, sitting upright in bed  HEENT: NCAT, MMM, clear OP  Neck: supple, trachea at midline  CV: RRR, no m/g/r appreciated  Pulm: CTA B, no w/r/r; no increase in WOB  Abd: normoactive BS, soft, NTND  Ext: WWP, 2+ pulses x4; no c/c/e  Neuro: AOx3, chronic L-sided facial paralysis, no dysarthria   Psych: pleasant, conversant and appropriate     LABS:                        12.9   9.32  )-----------( 307      ( 23 Feb 2022 05:51 )             40.0     02-23    138  |  108  |  12  ----------------------------<  92  4.1   |  20<L>  |  0.71    Ca    9.2      23 Feb 2022 05:51  Phos  3.3     02-23  Mg     2.1     02-23    TPro  6.7  /  Alb  4.2  /  TBili  0.5  /  DBili  x   /  AST  9<L>  /  ALT  7<L>  /  AlkPhos  64  02-23    MICRODATA:  No new microdata.    RADIOLOGY/OTHER STUDIES:  MRI Brain (2/22, per report)   Findings: There is no evidence of abnormal signal changes within the   brain parenchyma. There is no evidence of mass-effect or midline shift.   There is no evidence of intra or extra-axial fluid collection. The   ventricles are of normal size and    There is no evidence of abnormal susceptibility changes on gradient echo   images. Caliber. There is no evidence of acute infarction. Evaluation of   the intracranial vascular flow-voids appear within normal limits.    The visualized paranasal sinuses and bilateral mastoid air cells are   clear.    Impression: Normal MRI of the brain.  No evidence of acute infarction.

## 2022-02-23 NOTE — DISCHARGE NOTE PROVIDER - HOSPITAL COURSE
Hospital course:  36y Female with PMH migraine, Lt side weakness (possible old stroke), possible aneurysm (diagnosed on an image done for her headache workup), presented with headache associated with Rt side numbness, and weakness, blurry vision.     During this hospital course, patient had a complex migraine, as her MR brain showed no acute stroke.   The patient improved with resolution of her symptoms, and improvement of her headache.     Patient had the following workup done in house:  CT Head: No significant, with no acute findings  MR Head Non Contrast: No acute stroke   CT Angio Head and neck: No LVO, no aneurysm, or significant stenosis       Physical exam at discharge: sensory decrease on the Lt side of the face (which is old)    New medications on discharge: Topiramate 25mg daily for one week, to be increased 25mg per day every week to reach a therapeutic dose of 50mg bid.     Further outpatient workup: Need to bring the medical records from her hospital visit, when she was diagnosed with stroke, to her next outpatient clinic appointment

## 2022-02-23 NOTE — PROGRESS NOTE ADULT - ASSESSMENT
This is a 37yo woman with a PMH of brain aneurysm, Bell's Palsy and migraines who p/w two days of migraines associated with waxing and waning blurry vision and R-sided "heaviness".  Now s/p negative work-up for CVA.  Primary team's clinical suspicion more line with complex migraine.     #CVA Ruled-Out  -- MRI negative  -- no indication for TTE   -- no indication for ASA, Plavix and statin    #Complex Migraine  -- most likely primary diagnosis    -- primary planning to discharge on Topamax and Naproxen   -- will f/u with Neurology    #Diet - Regular  #DVT PPx - Ambulatory  #Dispo - Home today    Rehana Bagley  Attending Hospitalist  [phone]

## 2022-02-23 NOTE — PROGRESS NOTE ADULT - TIME BILLING
review of patient information including recent vital signs, labs, imaging, and notes; assessing, examining patient; updating patient/family; discussion and coordination of care with multidisciplinary team.
review of patient information including recent vital signs, labs, imaging, and notes; assessing, examining patient; updating patient/family; discussion and coordination of care with multidisciplinary team.

## 2022-02-23 NOTE — MEDICAL STUDENT PROGRESS NOTE(EDUCATION) - SUBJECTIVE AND OBJECTIVE BOX
Neurology Stroke Progress Note    INTERVAL HPI/OVERNIGHT EVENTS:  Patient seen and examined. No overnight events, patient states her headache is minimal and no longer has blurry vision or right sided weakness, numbness or tingling. MRI did not show any infarcts.    MEDICATIONS  (STANDING):  enoxaparin Injectable 40 milliGRAM(s) SubCutaneous at bedtime  topiramate 25 milliGRAM(s) Oral daily    MEDICATIONS  (PRN):    Allergies    No Known Allergies    Intolerances      Vital Signs Last 24 Hrs  T(C): 36.8 (2022 10:00), Max: 36.9 (2022 22:00)  T(F): 98.3 (2022 10:00), Max: 98.4 (2022 22:00)  HR: 82 (2022 12:16) (62 - 114)  BP: 109/55 (2022 12:16) (97/54 - 111/53)  BP(mean): 79 (2022 12:16) (72 - 81)  RR: 18 (2022 12:16) (18 - 18)  SpO2: 94% (2022 12:16) (74% - 96%)    Physical exam:  General: No acute distress, awake and alert  Eyes: Anicteric sclerae, moist conjunctivae, see below for CNs  Neck: trachea midline, FROM, supple, no thyromegaly or lymphadenopathy  Pulmonary: No use of accessory muscles    Neurologic:  -Mental status: Awake, alert, oriented to person, place, and time. Speech is fluent with intact naming, repetition, and comprehension, no dysarthria. Recent and remote memory intact. Follows commands. Attention/concentration intact. Fund of knowledge appropriate.  -Cranial nerves:   II: Visual fields are full to confrontation.  III, IV, VI: Extraocular movements are intact without nystagmus. Pupils equally round and reactive to light  V:  Facial sensation V1-V3 diminished on left side.  VII: At rest, left nasolabial fold appears more prominent than right. When asked to smile, the left side if the face does not smile. Patient unable to raise eyebrow on left side. Patient able to close both eyes and resist opening of lids.  VIII: Hearing is bilaterally intact to finger rub  IX, X: Uvula is midline and soft palate rises symmetrically  XI: Head turning and shoulder shrug are intact.  XII: Tongue protrudes midline  Motor: Normal bulk and tone. No pronator drift. Strength bilateral upper extremity 5/5, bilateral lower extremities 5/5.  Rapid alternating movements intact and symmetric  Sensation: Intact to light touch bilaterally. No neglect or extinction on double simultaneous testing.  Coordination: No dysmetria on finger-to-nose and heel-to-shin bilaterally  Reflexes: Downgoing toes bilaterally   Gait: Narrow gait and steady    LABS:                        12.9   9.32  )-----------( 307      ( 2022 05:51 )             40.0         138  |  108  |  12  ----------------------------<  92  4.1   |  20<L>  |  0.71    Ca    9.2      2022 05:51  Phos  3.3       Mg     2.1         TPro  6.7  /  Alb  4.2  /  TBili  0.5  /  DBili  x   /  AST  9<L>  /  ALT  7<L>  /  AlkPhos  64      PT/INR - ( 2022 22:20 )   PT: 12.6 sec;   INR: 1.05          PTT - ( 2022 22:20 )  PTT:37.8 sec  Urinalysis Basic - ( 2022 22:21 )    Color: Yellow / Appearance: Clear / S.015 / pH: x  Gluc: x / Ketone: NEGATIVE  / Bili: Negative / Urobili: 0.2 E.U./dL   Blood: x / Protein: NEGATIVE mg/dL / Nitrite: NEGATIVE   Leuk Esterase: NEGATIVE / RBC: x / WBC x   Sq Epi: x / Non Sq Epi: x / Bacteria: x        RADIOLOGY & ADDITIONAL TESTS:  < from: MR Head No Cont (22 @ 21:52) >  ACC: [id number] EXAM:  MR BRAIN                          PROCEDURE DATE:  2022          INTERPRETATION:  Migraine versus stroke. Technique: MRI of the brain was   performed utilizing sagittal and axial T1, axial T2, axial gradient echo,   axialand coronal FLAIR and diffusion imaging.    There are no prior studies available for comparison.    Findings: There is no evidence of abnormal signal changes within the   brain parenchyma. There is no evidence of mass-effect or midline shift.   Thereis no evidence of intra or extra-axial fluid collection. The   ventricles are of normal size and    There is no evidence of abnormal susceptibility changes on gradient echo   images. Caliber. There is no evidence of acute infarction. Evaluation of   the intracranial vascular flow-voids appear within normal limits.    The visualized paranasal sinuses and bilateral mastoid air cells are   clear.    Impression: Normal MRI of the brain.  No evidence of acute infarction.    --- End of Report ---    < end of copied text >

## 2022-02-23 NOTE — DISCHARGE NOTE PROVIDER - NSDCMRMEDTOKEN_GEN_ALL_CORE_FT
naproxen 250 mg oral tablet: 1 tab(s) orally 2 times a day   topiramate 25 mg oral tablet: 25mg daily for one week, then 25mg bid for one week, then 50mg am and 25mg pm for one week, then 50mg bid daily until seen in the outpatient clinic

## 2022-02-26 DIAGNOSIS — E78.5 HYPERLIPIDEMIA, UNSPECIFIED: ICD-10-CM

## 2022-02-26 DIAGNOSIS — I10 ESSENTIAL (PRIMARY) HYPERTENSION: ICD-10-CM

## 2022-02-26 DIAGNOSIS — Z90.49 ACQUIRED ABSENCE OF OTHER SPECIFIED PARTS OF DIGESTIVE TRACT: ICD-10-CM

## 2022-02-26 DIAGNOSIS — G51.0 BELL'S PALSY: ICD-10-CM

## 2022-02-26 DIAGNOSIS — G43.109 MIGRAINE WITH AURA, NOT INTRACTABLE, WITHOUT STATUS MIGRAINOSUS: ICD-10-CM

## 2022-04-10 NOTE — ED PROVIDER NOTE - SKIN, MLM
[facility]                                                                            Surveillance Visit           REFERRING PHYSICIAN:                 Masha Elizondo MD     MEDICAL ONCOLOGIST:                 Dr. Miller  RADIATION ONCOLOGIST:             none          DIAGNOSIS:    This is a 68 y.o. 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        TREATMENT SUMMARY:    The patient is status post right unilateral mastectomy and sentinel node biopsy on 9/13/2021. All lymph nodes negative for malignancy. All margins negative for DCIS.      Final pathology showed   Part 1   Lymph nodes (2, submitted as right axillary sentinel node hot 656):   -No evidence of malignancy on H&E or immunohistochemical stains   Part 2   Lymph node (1, submitted as right axillary sentinel node hot 171):   -No evidence of malignancy on H&E or immunohistochemical stains   Part 3   Breast with skin and nipple (total mastectomy, submitted as right breast):   -Ductal carcinoma in situ (DCIS), intermediate nuclear grade, papillary and   cribriform patterns with focal necrosis   -Carcinoma consists of an aggregate of involved ducts microscopically   measuring 21 x 9 mm (2.1 x 0.9 cm) located at 6 o'clock   -Carcinoma is closest to the posterior margin which is 1.3 cm distant.  All   margins, skin, and nipple are free of malignancy.   -Breast biomarkers (based on previous [facility] surgical specimen   WBS- reported June 9, 2021):        -ER Positive (strong, 90%)        -MA - Positive ([id number], 20%)   -Uninvolved breast shows prominent fibrocystic changes and focal ductal   epithelial hyperplasia without atypia (UDH)   -AJCC TN: pTis (DCIS) pN0(sn)   -Complete AJCC/CAP synoptic staging form follows:   STAGING FORM for DCIS of the BREAST (CAP/AJCC February 2020 Protocol)   Procedure:  Total mastectomy with skin and nipple   Specimen laterality:  Right   Estimated  size (extent) of DCIS is at least:  21 mm   Histologic type: Ductal carcinoma in situ   Nuclear grade: Grade II (intermediate)   Necrosis: Present, focal   Margins: Uninvolved by DCIS        -Distance from closest margin:  12 mm        -Specify closest margin (required only if less than 2 mm):  Posterior   Regional lymph nodes (pN):        -Uninvolved by tumor cells             -Total Number of Lymph Nodes Examined:  3             -Number of [place] Nodes Examined: 3   Pathologic Stage Classification (pTNM):        -Primary tumor (pT):             -pTis (DCIS)        -Regional lymph nodes (pN):             -Regional Lymph Nodes Modifier: (sn)  ([place] nodes evaluated)             -Category (pN): pN0 (No regional lymph node metastasis identified)   Block for possible molecular studies:  3F      INTERVAL HISTORY (10/04/21):   Cathy Reynolds comes in for a post-op check.  She denies fever, chills, chest pain or shortness of breath.  Her pain is well controlled.      INTERVAL HISTORY (04/10/22):   Patient presents for surveillance follow up. Mammogram 04/08/22 results pending.   She is doing well with good ROM, no new HA, vision change, bony or back pain, SOB, and has gained a few pounds, which she struggles with.  Overall well and fully recovered.  Declined endocrine therapy     MEDICATIONS:  Current Medications          Current Outpatient Medications   Medication Sig Dispense Refill    alendronate (FOSAMAX) 70 MG tablet Take 1 tablet by mouth once a week 12 tablet 1    cholecalciferol, vitamin D3, (VITAMIN D3) 2,000 unit Cap Take 2 capsules by mouth once daily.         clotrimazole-betamethasone 1-0.05% (LOTRISONE) cream Apply topically 2 (two) times daily.        co-enzyme Q-10 30 mg capsule Take 30 mg by mouth once daily.        fluticasone propionate (FLONASE) 50 mcg/actuation nasal spray USE 1 SPRAY IN EACH NOSTRIL TWICE A DAY 16 g 2    folic acid/multivit-min/lutein (CENTRUM SILVER ORAL) Take by  mouth.        HYDROcodone-acetaminophen (NORCO) 5-325 mg per tablet Take 1 tablet by mouth every 6 (six) hours as needed for Pain. 20 tablet 0    ketoconazole (NIZORAL) 2 % shampoo Apply topically twice a week.        olmesartan (BENICAR) 5 MG Tab Take 1 tablet (5 mg total) by mouth once daily. 90 tablet 3    olopatadine (PATANOL) 0.1 % ophthalmic solution INT 1 GTT ON OU BID   4      No current facility-administered medications for this visit.            ALLERGIES:         Review of patient's allergies indicates:   Allergen Reactions    Ace inhibitors         Other reaction(s): cough    Diltiazem Other (See Comments)       - rash, lip numb, weak    Norvasc [amlodipine]         MUSCLE TWITCHING     Penicillins Hives        PHYSICAL EXAMINATION:   Physical Exam   Constitutional: She appears well-developed and well-nourished.   HENT:   Head: Normocephalic.   Eyes: No scleral icterus.   Neck: No tracheal deviation present.   Cardiovascular: Normal rate and regular rhythm.    Pulmonary/Chest: Breath sounds normal. No respiratory distress. Left breast exhibits no inverted nipple, no mass, no nipple discharge and no skin change.       Abdominal: Soft. She exhibits no mass. There is no abdominal tenderness.   Musculoskeletal: No edema.   Lymphadenopathy:     She has no cervical adenopathy.   Neurological: She is alert.   Skin: No rash noted. No erythema.     Psychiatric: She has a normal mood and affect.        IMPRESSION:   This is a 68 y.o. 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     PLAN:   1. return in 1 year for a follow up office visit and breast exam  2. will review results of bilateral mammogram in 04/08/22 once complete.  If clear, will need next in 1 year  3. The patient is advised in continued exam of the breast chest wall and to report to this office sooner should she note any areas of abnormality or concern.   4. Last seen by Dr. Miller on 3/23/22. She has declined  adjuvant endocrine therapy.      25 minutes were spent on this encounter, 15 of which was face to face counseling and 10 minutes were spent on chart review and coordination of care.      ADDENDUM:  MMG result BIRADS 1   Skin normal color for race, warm, dry and intact. No evidence of rash.

## 2022-09-12 NOTE — DISCHARGE NOTE NURSING/CASE MANAGEMENT/SOCIAL WORK - HAS THE PATIENT RECEIVED THE INFLUENZA VACCINE THIS SEASON?
yes...

## 2022-10-28 NOTE — PATIENT PROFILE ADULT - PATIENT REPRESENTATIVE: ( YOU CAN CHOOSE ANY PERSON THAT CAN ASSIST YOU WITH YOUR HEALTH CARE PREFERENCES, DOES NOT HAVE TO BE A SPOUSE, IMMEDIATE FAMILY OR SIGNIFICANT OTHER/PARTNER)
Patient has est jose manuel w/GE[facility] Pe[facility]. Pt is requesting a refill on the gabapentin.   Rx Refill Note     Last office visit with prescribing clinician: 9/23/2022      Next office visit with prescribing clinician: 12/12/2022    yes

## 2023-02-22 ENCOUNTER — EMERGENCY (EMERGENCY)
Facility: HOSPITAL | Age: 38
LOS: 1 days | Discharge: HOME | End: 2023-02-22
Attending: STUDENT IN AN ORGANIZED HEALTH CARE EDUCATION/TRAINING PROGRAM | Admitting: STUDENT IN AN ORGANIZED HEALTH CARE EDUCATION/TRAINING PROGRAM
Payer: COMMERCIAL

## 2023-02-22 VITALS
HEART RATE: 93 BPM | WEIGHT: 126.1 LBS | SYSTOLIC BLOOD PRESSURE: 134 MMHG | DIASTOLIC BLOOD PRESSURE: 86 MMHG | OXYGEN SATURATION: 98 % | TEMPERATURE: 98 F | RESPIRATION RATE: 18 BRPM

## 2023-02-22 DIAGNOSIS — Z86.79 PERSONAL HISTORY OF OTHER DISEASES OF THE CIRCULATORY SYSTEM: ICD-10-CM

## 2023-02-22 DIAGNOSIS — R10.13 EPIGASTRIC PAIN: ICD-10-CM

## 2023-02-22 DIAGNOSIS — G43.909 MIGRAINE, UNSPECIFIED, NOT INTRACTABLE, WITHOUT STATUS MIGRAINOSUS: ICD-10-CM

## 2023-02-22 DIAGNOSIS — Z90.49 ACQUIRED ABSENCE OF OTHER SPECIFIED PARTS OF DIGESTIVE TRACT: Chronic | ICD-10-CM

## 2023-02-22 DIAGNOSIS — R51.9 HEADACHE, UNSPECIFIED: ICD-10-CM

## 2023-02-22 DIAGNOSIS — Z20.822 CONTACT WITH AND (SUSPECTED) EXPOSURE TO COVID-19: ICD-10-CM

## 2023-02-22 DIAGNOSIS — Z86.69 PERSONAL HISTORY OF OTHER DISEASES OF THE NERVOUS SYSTEM AND SENSE ORGANS: ICD-10-CM

## 2023-02-22 LAB
ALBUMIN SERPL ELPH-MCNC: 4 G/DL — SIGNIFICANT CHANGE UP (ref 3.3–5)
ALP SERPL-CCNC: 76 U/L — SIGNIFICANT CHANGE UP (ref 40–120)
ALT FLD-CCNC: 9 U/L — LOW (ref 10–45)
ANION GAP SERPL CALC-SCNC: 11 MMOL/L — SIGNIFICANT CHANGE UP (ref 5–17)
APPEARANCE UR: CLEAR — SIGNIFICANT CHANGE UP
APTT BLD: 39 SEC — HIGH (ref 27.5–35.5)
AST SERPL-CCNC: 13 U/L — SIGNIFICANT CHANGE UP (ref 10–40)
BASOPHILS # BLD AUTO: 0.05 K/UL — SIGNIFICANT CHANGE UP (ref 0–0.2)
BASOPHILS NFR BLD AUTO: 0.5 % — SIGNIFICANT CHANGE UP (ref 0–2)
BILIRUB SERPL-MCNC: 0.3 MG/DL — SIGNIFICANT CHANGE UP (ref 0.2–1.2)
BILIRUB UR-MCNC: NEGATIVE — SIGNIFICANT CHANGE UP
BUN SERPL-MCNC: 15 MG/DL — SIGNIFICANT CHANGE UP (ref 7–23)
CALCIUM SERPL-MCNC: 9.6 MG/DL — SIGNIFICANT CHANGE UP (ref 8.4–10.5)
CHLORIDE SERPL-SCNC: 104 MMOL/L — SIGNIFICANT CHANGE UP (ref 96–108)
CO2 SERPL-SCNC: 25 MMOL/L — SIGNIFICANT CHANGE UP (ref 22–31)
COLOR SPEC: YELLOW — SIGNIFICANT CHANGE UP
CREAT SERPL-MCNC: 0.66 MG/DL — SIGNIFICANT CHANGE UP (ref 0.5–1.3)
DIFF PNL FLD: NEGATIVE — SIGNIFICANT CHANGE UP
EGFR: 116 ML/MIN/1.73M2 — SIGNIFICANT CHANGE UP
EOSINOPHIL # BLD AUTO: 0.09 K/UL — SIGNIFICANT CHANGE UP (ref 0–0.5)
EOSINOPHIL NFR BLD AUTO: 0.8 % — SIGNIFICANT CHANGE UP (ref 0–6)
GLUCOSE SERPL-MCNC: 103 MG/DL — HIGH (ref 70–99)
GLUCOSE UR QL: NEGATIVE — SIGNIFICANT CHANGE UP
HCG SERPL-ACNC: <0 MIU/ML — SIGNIFICANT CHANGE UP
HCG UR QL: NEGATIVE — SIGNIFICANT CHANGE UP
HCT VFR BLD CALC: 39.1 % — SIGNIFICANT CHANGE UP (ref 34.5–45)
HGB BLD-MCNC: 12.6 G/DL — SIGNIFICANT CHANGE UP (ref 11.5–15.5)
IMM GRANULOCYTES NFR BLD AUTO: 0.4 % — SIGNIFICANT CHANGE UP (ref 0–0.9)
INR BLD: 0.99 — SIGNIFICANT CHANGE UP (ref 0.88–1.16)
KETONES UR-MCNC: ABNORMAL MG/DL
LEUKOCYTE ESTERASE UR-ACNC: NEGATIVE — SIGNIFICANT CHANGE UP
LYMPHOCYTES # BLD AUTO: 2.42 K/UL — SIGNIFICANT CHANGE UP (ref 1–3.3)
LYMPHOCYTES # BLD AUTO: 22.2 % — SIGNIFICANT CHANGE UP (ref 13–44)
MCHC RBC-ENTMCNC: 29 PG — SIGNIFICANT CHANGE UP (ref 27–34)
MCHC RBC-ENTMCNC: 32.2 GM/DL — SIGNIFICANT CHANGE UP (ref 32–36)
MCV RBC AUTO: 90.1 FL — SIGNIFICANT CHANGE UP (ref 80–100)
MONOCYTES # BLD AUTO: 0.49 K/UL — SIGNIFICANT CHANGE UP (ref 0–0.9)
MONOCYTES NFR BLD AUTO: 4.5 % — SIGNIFICANT CHANGE UP (ref 2–14)
NEUTROPHILS # BLD AUTO: 7.79 K/UL — HIGH (ref 1.8–7.4)
NEUTROPHILS NFR BLD AUTO: 71.6 % — SIGNIFICANT CHANGE UP (ref 43–77)
NITRITE UR-MCNC: NEGATIVE — SIGNIFICANT CHANGE UP
NRBC # BLD: 0 /100 WBCS — SIGNIFICANT CHANGE UP (ref 0–0)
PH UR: 6.5 — SIGNIFICANT CHANGE UP (ref 5–8)
PLATELET # BLD AUTO: 353 K/UL — SIGNIFICANT CHANGE UP (ref 150–400)
POTASSIUM SERPL-MCNC: 3.8 MMOL/L — SIGNIFICANT CHANGE UP (ref 3.5–5.3)
POTASSIUM SERPL-SCNC: 3.8 MMOL/L — SIGNIFICANT CHANGE UP (ref 3.5–5.3)
PROT SERPL-MCNC: 6.8 G/DL — SIGNIFICANT CHANGE UP (ref 6–8.3)
PROT UR-MCNC: NEGATIVE MG/DL — SIGNIFICANT CHANGE UP
PROTHROM AB SERPL-ACNC: 11.8 SEC — SIGNIFICANT CHANGE UP (ref 10.5–13.4)
RBC # BLD: 4.34 M/UL — SIGNIFICANT CHANGE UP (ref 3.8–5.2)
RBC # FLD: 12 % — SIGNIFICANT CHANGE UP (ref 10.3–14.5)
SARS-COV-2 RNA SPEC QL NAA+PROBE: NEGATIVE — SIGNIFICANT CHANGE UP
SODIUM SERPL-SCNC: 140 MMOL/L — SIGNIFICANT CHANGE UP (ref 135–145)
SP GR SPEC: 1.02 — SIGNIFICANT CHANGE UP (ref 1–1.03)
TROPONIN T SERPL-MCNC: <0.01 NG/ML — SIGNIFICANT CHANGE UP (ref 0–0.01)
UROBILINOGEN FLD QL: 0.2 E.U./DL — SIGNIFICANT CHANGE UP
WBC # BLD: 10.88 K/UL — HIGH (ref 3.8–10.5)
WBC # FLD AUTO: 10.88 K/UL — HIGH (ref 3.8–10.5)

## 2023-02-22 PROCEDURE — 84484 ASSAY OF TROPONIN QUANT: CPT

## 2023-02-22 PROCEDURE — 85730 THROMBOPLASTIN TIME PARTIAL: CPT

## 2023-02-22 PROCEDURE — 87635 SARS-COV-2 COVID-19 AMP PRB: CPT

## 2023-02-22 PROCEDURE — 85610 PROTHROMBIN TIME: CPT

## 2023-02-22 PROCEDURE — 99285 EMERGENCY DEPT VISIT HI MDM: CPT | Mod: 25

## 2023-02-22 PROCEDURE — 36415 COLL VENOUS BLD VENIPUNCTURE: CPT

## 2023-02-22 PROCEDURE — 70496 CT ANGIOGRAPHY HEAD: CPT | Mod: MA

## 2023-02-22 PROCEDURE — 93005 ELECTROCARDIOGRAM TRACING: CPT

## 2023-02-22 PROCEDURE — 85025 COMPLETE CBC W/AUTO DIFF WBC: CPT

## 2023-02-22 PROCEDURE — 81025 URINE PREGNANCY TEST: CPT

## 2023-02-22 PROCEDURE — 70450 CT HEAD/BRAIN W/O DYE: CPT | Mod: 26,MA,59

## 2023-02-22 PROCEDURE — 70496 CT ANGIOGRAPHY HEAD: CPT | Mod: 26,MA

## 2023-02-22 PROCEDURE — 82962 GLUCOSE BLOOD TEST: CPT

## 2023-02-22 PROCEDURE — 70498 CT ANGIOGRAPHY NECK: CPT | Mod: 26,MA

## 2023-02-22 PROCEDURE — 70498 CT ANGIOGRAPHY NECK: CPT | Mod: MA

## 2023-02-22 PROCEDURE — 96375 TX/PRO/DX INJ NEW DRUG ADDON: CPT | Mod: XU

## 2023-02-22 PROCEDURE — 84702 CHORIONIC GONADOTROPIN TEST: CPT

## 2023-02-22 PROCEDURE — 81003 URINALYSIS AUTO W/O SCOPE: CPT

## 2023-02-22 PROCEDURE — 99285 EMERGENCY DEPT VISIT HI MDM: CPT

## 2023-02-22 PROCEDURE — 96365 THER/PROPH/DIAG IV INF INIT: CPT | Mod: XU

## 2023-02-22 PROCEDURE — 83690 ASSAY OF LIPASE: CPT

## 2023-02-22 PROCEDURE — 80053 COMPREHEN METABOLIC PANEL: CPT

## 2023-02-22 PROCEDURE — 70450 CT HEAD/BRAIN W/O DYE: CPT | Mod: MA

## 2023-02-22 RX ORDER — MAGNESIUM SULFATE 500 MG/ML
1 VIAL (ML) INJECTION ONCE
Refills: 0 | Status: COMPLETED | OUTPATIENT
Start: 2023-02-22 | End: 2023-02-22

## 2023-02-22 RX ORDER — VALPROIC ACID (AS SODIUM SALT) 250 MG/5ML
1000 SOLUTION, ORAL ORAL ONCE
Refills: 0 | Status: DISCONTINUED | OUTPATIENT
Start: 2023-02-22 | End: 2023-02-23

## 2023-02-22 RX ORDER — KETOROLAC TROMETHAMINE 30 MG/ML
15 SYRINGE (ML) INJECTION ONCE
Refills: 0 | Status: DISCONTINUED | OUTPATIENT
Start: 2023-02-22 | End: 2023-02-22

## 2023-02-22 RX ORDER — SODIUM CHLORIDE 9 MG/ML
1000 INJECTION INTRAMUSCULAR; INTRAVENOUS; SUBCUTANEOUS ONCE
Refills: 0 | Status: COMPLETED | OUTPATIENT
Start: 2023-02-22 | End: 2023-02-22

## 2023-02-22 RX ORDER — FAMOTIDINE 10 MG/ML
20 INJECTION INTRAVENOUS ONCE
Refills: 0 | Status: COMPLETED | OUTPATIENT
Start: 2023-02-22 | End: 2023-02-22

## 2023-02-22 RX ORDER — METOCLOPRAMIDE HCL 10 MG
10 TABLET ORAL ONCE
Refills: 0 | Status: COMPLETED | OUTPATIENT
Start: 2023-02-22 | End: 2023-02-22

## 2023-02-22 RX ADMIN — Medication 30 MILLILITER(S): at 18:37

## 2023-02-22 RX ADMIN — SODIUM CHLORIDE 1000 MILLILITER(S): 9 INJECTION INTRAMUSCULAR; INTRAVENOUS; SUBCUTANEOUS at 22:51

## 2023-02-22 RX ADMIN — Medication 1 GRAM(S): at 23:01

## 2023-02-22 RX ADMIN — SODIUM CHLORIDE 1000 MILLILITER(S): 9 INJECTION INTRAMUSCULAR; INTRAVENOUS; SUBCUTANEOUS at 21:51

## 2023-02-22 RX ADMIN — Medication 100 GRAM(S): at 22:01

## 2023-02-22 RX ADMIN — Medication 10 MILLIGRAM(S): at 23:41

## 2023-02-22 RX ADMIN — Medication 15 MILLIGRAM(S): at 22:00

## 2023-02-22 RX ADMIN — FAMOTIDINE 20 MILLIGRAM(S): 10 INJECTION INTRAVENOUS at 18:37

## 2023-02-22 RX ADMIN — Medication 15 MILLIGRAM(S): at 21:51

## 2023-02-22 NOTE — ED PROVIDER NOTE - NSFOLLOWUPINSTRUCTIONS_ED_ALL_ED_FT
Follow up with your primary medical doctor as soon as possible.   Follow-up with your neurologist as soon as possible.  Return to the emergency department if your symptoms worsen or if you develop new symptoms.  If you have any problems with followup, please call the ED Referral Coordinator at [phone].    Headache    A headache is pain or discomfort felt around the head or neck area. The specific cause of a headache may not be found as there are many types including tension headaches, migraine headaches, and cluster headaches. Watch your condition for any changes. Things you can do to manage your pain include taking over the counter and prescription medications as instructed by your health care provider, lying down in a dark quiet room, limiting stress, getting regular sleep, and refraining from alcohol and tobacco products.    SEEK IMMEDIATE MEDICAL CARE IF YOU HAVE ANY OF THE FOLLOWING SYMPTOMS: fever, vomiting, stiff neck, loss of vision, problems with speech, muscle weakness, loss of balance, trouble walking, passing out, or confusion.    Abdominal Pain    Many things can cause abdominal pain. Many times, abdominal pain is not caused by a disease and will improve without treatment. Your health care provider will do a physical exam to determine if there is a dangerous cause of your pain; blood tests and imaging may help determine the cause of your pain. However, in many cases, no cause may be found and you may need further testing as an outpatient. Monitor your abdominal pain for any changes.     SEEK IMMEDIATE MEDICAL CARE IF YOU HAVE ANY OF THE FOLLOWING SYMPTOMS: worsening abdominal pain, uncontrollable vomiting, profuse diarrhea, inability to have bowel movements or pass gas, black or bloody stools, fever accompanying chest pain or back pain, or fainting. These symptoms may represent a serious problem that is an emergency. Do not wait to see if the symptoms will go away. Get medical help right away. Call 911 and do not drive yourself to the hospital.

## 2023-02-22 NOTE — ED ADULT NURSE NOTE - OBJECTIVE STATEMENT
Patient to ED with multiple medical complaints. C/O abdominal cramping, headache with vision changes, bilateral upper extremity tingling since lunch time today. BEFAST-, no unilateral weakness or speech complaints. Patient is anxious, AAOX4, NAD.

## 2023-02-22 NOTE — ED PROVIDER NOTE - CLINICAL SUMMARY MEDICAL DECISION MAKING FREE TEXT BOX
Patient with low suspicion for stroke vs ICH, however due to prior history, warrants imaging - will CT/CTA, consult stroke.  Vision 20/15 OU in ED. No new neuro deficits on exam.  Work up: CT head, CTA head/neck, EKG, CBC, CMP.  +/- MRI per stroke team recs.  R/o metabolic and peripheral causes of symptoms.  Will re-assess and closely monitor pt.    Epigastric pain  Patient is overall well appearing and is suspected to have a transient course of illness.  Given History and Exam there does not appear to be an emergent cause of the symptoms such as small bowel obstruction (no prior abdominal surgeries), coronary syndrome (no history of chest pain or significant family history, bowel ischemia, DKA, pancreatitis appendicitis, other acute abdomen or other emergent problem.  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  Improved with medication.  No imaging required at this time. Patient with low suspicion for stroke vs ICH, however due to prior history, warrants imaging - will CT/CTA, consult stroke.  Vision 20/15 OU in ED. No new neuro deficits on exam.  Work up: CT head, CTA head/neck, EKG, CBC, CMP.  +/- MRI per stroke team recs.  R/o metabolic and peripheral causes of symptoms.  Will re-assess and closely monitor pt.  No signs of meningismus, no neck stiffness, no fever. HA was not maximal at onset, similar to prior headaches.  Epigastric pain  Patient is overall well appearing and is suspected to have a transient course of illness.  Given History and Exam there does not appear to be an emergent cause of the symptoms such as small bowel obstruction (no prior abdominal surgeries), coronary syndrome (no history of chest pain or significant family history, bowel ischemia, DKA, pancreatitis appendicitis, other acute abdomen or other emergent problem.  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  Improved with medication.  No imaging required at this time.

## 2023-02-22 NOTE — ED PROVIDER NOTE - PHYSICAL EXAMINATION
CONSTITUTIONAL: Non-toxic; in no apparent distress  HEAD: Normocephalic; atraumatic  EYES: PERRL; EOM intact, visual accuity: OD: 20/15 OS: 20/15  ENMT: External appears normal  NECK: Supple; non-tender  CARD: Normal S1, S2; no murmurs, rubs, or gallops  RESP: Normal chest excursion with respiration; breath sounds clear and equal bilaterally  ABD: Soft, non-distended; non-tender  EXT: Normal ROM in all four extremities; non-tender to palpation  SKIN: Warm, dry, no rash  NEURO: + L facial droop (old), no dysmetria, no pronator drift, gait normal, strength/sensation intact throughout, normal cognition

## 2023-02-22 NOTE — ED PROVIDER NOTE - PROGRESS NOTE DETAILS
CT, CTA unremarkable. Patient's symptoms improved with medication. High suspicion of migraine headache. Patient has follow-up with her neurologist and will see as outpatient.  Given return precautions and instructed to come back to ED for change in symptoms, nausea/vomiting, change in vision, weakness, numbness, worsening headache.

## 2023-02-22 NOTE — ED ADULT NURSE NOTE - HISTORY OF COVID-19 VACCINATION
Onset: 1 week    Symptoms:   Cough, patient states that she has a wheezing   Sore throat in the mornings only  Patient states that if she takes a deep breath she will cough    Denies:    Fever  congestion  Chest pain  Difficulty breathing    Tried:  Prednisone, 20 mg tabs,  only had 4 tablets patient states that this helped  Ventolin inhaler 90 mcg,  3/2022 this has been helping as well     Per protocol pt should be able to take care of this at home. Patient state that she would like to see Dr. Ann to get a renewal of the inhaler and prednisone. Patient has an appointment on .  Pt given care advice and reasons to call back.  No further questions.  Routing to pcp for fyi.       Reason for Disposition  • Common cold with no complications    Protocols used: COMMON COLD-A-       Patient has the following symptoms: Cough, wheezing, and shortness of breath for the past week.    None Yes

## 2023-02-22 NOTE — ED PROVIDER NOTE - PATIENT PORTAL LINK FT
You can access the FollowMyHealth Patient Portal offered by [facility] by registering at the following website: [email or web address]. By joining "[employer]’s FollowMyHealth portal, you will also be able to view your health information using other applications (apps) compatible with our system.

## 2023-02-22 NOTE — ED PROVIDER NOTE - OBJECTIVE STATEMENT
36 yo F w PMH of migraines, unspecified brain aneurysm, L Burnett's palsy, p/w 2 weeks of headache, 1 week of dizziness, and 1 day of intermittent blurred vision.   Patient describes bilateral frontal and occipital headache for 2 weeks that comes and goes, not worse in the morning, no accompanying nausea or vomiting. She has had similar headaches in the past. Headache improved with Advil. Since 1 week ago, she has developed intermittent dizziness described as both vertigo and lightheadedness, it is positional, better with rest, exacerbated by movement. She has had similar episodes of dizziness in the past.  At noon today, she developed bilateral blurred vision that lasted approximately 2 minutes before completely resolving.  Of note, patient had similar constellation of symptoms exactly 1 year ago and had negative MRI at that time.  Patient also complains of epigastric abdominal pain since today, similar to prior episodes of ?gastritis for which she has had EGD. At the time abd pain started, pt also developed BLUE "heaviness", however no reported weakness.

## 2023-02-22 NOTE — ED ADULT TRIAGE NOTE - CHIEF COMPLAINT QUOTE
Pt c/o multiple medical complaints. Pt reports h/a x 2 weeks, b/l blurry vision at noon today lasting "minutes" since resolved. Pt also c/o chest pain, b/l arm numbness and weakness since 1400 today. Pt talking in clear, full sentances, ambulatory with steady gait. Hx of migraines and "told I had a brain aneurism 3 years ago."

## 2023-02-22 NOTE — ED PROVIDER NOTE - NS ED ROS FT
CONSTITUTIONAL: No fever, no chills, no fatigue  EYES: No eye redness, no visual changes  ENT: No ear pain, no sore throat  CARDIOVASCULAR: No chest pain, no palpitations  RESPIRATORY: No cough, no SOB  GI: +abdominal pain, no nausea, no vomiting, no constipation, no diarrhea  GENITOURINARY: No dysuria, no frequency, no hematuria  MUSCULOSKELETAL: No back pain, no joint pain, no myalgias  SKIN: No rash, no peripheral edema  NEURO: +headache, no confusion, + dizziness    ALL OTHER SYSTEMS NEGATIVE.

## 2023-02-23 VITALS
TEMPERATURE: 98 F | SYSTOLIC BLOOD PRESSURE: 115 MMHG | OXYGEN SATURATION: 99 % | HEART RATE: 69 BPM | DIASTOLIC BLOOD PRESSURE: 76 MMHG | RESPIRATION RATE: 18 BRPM

## 2023-07-02 NOTE — PATIENT PROFILE ADULT - FUNCTIONAL ASSESSMENT - DAILY ACTIVITY 4.
CRT, CVA, recurrent falls, COPD, AMINATA, CRT, CVA, recurrent falls, COPD, AMINATA, 4 = No assist / stand by assistance

## 2024-03-01 ENCOUNTER — EMERGENCY (EMERGENCY)
Facility: HOSPITAL | Age: 39
LOS: 1 days | Discharge: HOME | End: 2024-03-01
Attending: STUDENT IN AN ORGANIZED HEALTH CARE EDUCATION/TRAINING PROGRAM | Admitting: STUDENT IN AN ORGANIZED HEALTH CARE EDUCATION/TRAINING PROGRAM
Payer: COMMERCIAL

## 2024-03-01 VITALS
TEMPERATURE: 98 F | WEIGHT: 119.93 LBS | RESPIRATION RATE: 16 BRPM | DIASTOLIC BLOOD PRESSURE: 72 MMHG | HEART RATE: 62 BPM | HEIGHT: 64 IN | OXYGEN SATURATION: 97 % | SYSTOLIC BLOOD PRESSURE: 115 MMHG

## 2024-03-01 DIAGNOSIS — H91.90 UNSPECIFIED HEARING LOSS, UNSPECIFIED EAR: ICD-10-CM

## 2024-03-01 DIAGNOSIS — R51.9 HEADACHE, UNSPECIFIED: ICD-10-CM

## 2024-03-01 DIAGNOSIS — Z20.822 CONTACT WITH AND (SUSPECTED) EXPOSURE TO COVID-19: ICD-10-CM

## 2024-03-01 DIAGNOSIS — G43.909 MIGRAINE, UNSPECIFIED, NOT INTRACTABLE, WITHOUT STATUS MIGRAINOSUS: ICD-10-CM

## 2024-03-01 DIAGNOSIS — Z90.49 ACQUIRED ABSENCE OF OTHER SPECIFIED PARTS OF DIGESTIVE TRACT: Chronic | ICD-10-CM

## 2024-03-01 LAB
ANION GAP SERPL CALC-SCNC: 12 MMOL/L — SIGNIFICANT CHANGE UP (ref 5–17)
BUN SERPL-MCNC: 15 MG/DL — SIGNIFICANT CHANGE UP (ref 7–23)
CALCIUM SERPL-MCNC: 9.7 MG/DL — SIGNIFICANT CHANGE UP (ref 8.4–10.5)
CHLORIDE SERPL-SCNC: 101 MMOL/L — SIGNIFICANT CHANGE UP (ref 96–108)
CO2 SERPL-SCNC: 26 MMOL/L — SIGNIFICANT CHANGE UP (ref 22–31)
CREAT SERPL-MCNC: 0.7 MG/DL — SIGNIFICANT CHANGE UP (ref 0.5–1.3)
EGFR: 113 ML/MIN/1.73M2 — SIGNIFICANT CHANGE UP
GLUCOSE SERPL-MCNC: 91 MG/DL — SIGNIFICANT CHANGE UP (ref 70–99)
HCG SERPL-ACNC: <0 MIU/ML — SIGNIFICANT CHANGE UP
HCT VFR BLD CALC: 42.7 % — SIGNIFICANT CHANGE UP (ref 34.5–45)
HGB BLD-MCNC: 13.9 G/DL — SIGNIFICANT CHANGE UP (ref 11.5–15.5)
MCHC RBC-ENTMCNC: 29.8 PG — SIGNIFICANT CHANGE UP (ref 27–34)
MCHC RBC-ENTMCNC: 32.6 GM/DL — SIGNIFICANT CHANGE UP (ref 32–36)
MCV RBC AUTO: 91.4 FL — SIGNIFICANT CHANGE UP (ref 80–100)
NRBC # BLD: 0 /100 WBCS — SIGNIFICANT CHANGE UP (ref 0–0)
PLATELET # BLD AUTO: 338 K/UL — SIGNIFICANT CHANGE UP (ref 150–400)
POTASSIUM SERPL-MCNC: 3.9 MMOL/L — SIGNIFICANT CHANGE UP (ref 3.5–5.3)
POTASSIUM SERPL-SCNC: 3.9 MMOL/L — SIGNIFICANT CHANGE UP (ref 3.5–5.3)
RBC # BLD: 4.67 M/UL — SIGNIFICANT CHANGE UP (ref 3.8–5.2)
RBC # FLD: 11.7 % — SIGNIFICANT CHANGE UP (ref 10.3–14.5)
SODIUM SERPL-SCNC: 139 MMOL/L — SIGNIFICANT CHANGE UP (ref 135–145)
WBC # BLD: 11.92 K/UL — HIGH (ref 3.8–10.5)
WBC # FLD AUTO: 11.92 K/UL — HIGH (ref 3.8–10.5)

## 2024-03-01 PROCEDURE — 99284 EMERGENCY DEPT VISIT MOD MDM: CPT

## 2024-03-01 RX ORDER — DIPHENHYDRAMINE HCL 50 MG
25 CAPSULE ORAL ONCE
Refills: 0 | Status: COMPLETED | OUTPATIENT
Start: 2024-03-01 | End: 2024-03-01

## 2024-03-01 RX ORDER — MECLIZINE HCL 12.5 MG
25 TABLET ORAL ONCE
Refills: 0 | Status: COMPLETED | OUTPATIENT
Start: 2024-03-01 | End: 2024-03-01

## 2024-03-01 RX ORDER — SODIUM CHLORIDE 9 MG/ML
1000 INJECTION INTRAMUSCULAR; INTRAVENOUS; SUBCUTANEOUS ONCE
Refills: 0 | Status: COMPLETED | OUTPATIENT
Start: 2024-03-01 | End: 2024-03-01

## 2024-03-01 RX ORDER — KETOROLAC TROMETHAMINE 30 MG/ML
15 SYRINGE (ML) INJECTION ONCE
Refills: 0 | Status: DISCONTINUED | OUTPATIENT
Start: 2024-03-01 | End: 2024-03-01

## 2024-03-01 RX ORDER — METOCLOPRAMIDE HCL 10 MG
10 TABLET ORAL ONCE
Refills: 0 | Status: COMPLETED | OUTPATIENT
Start: 2024-03-01 | End: 2024-03-01

## 2024-03-01 RX ADMIN — SODIUM CHLORIDE 1000 MILLILITER(S): 9 INJECTION INTRAMUSCULAR; INTRAVENOUS; SUBCUTANEOUS at 23:20

## 2024-03-01 RX ADMIN — Medication 10 MILLIGRAM(S): at 23:25

## 2024-03-01 RX ADMIN — Medication 25 MILLIGRAM(S): at 23:21

## 2024-03-01 RX ADMIN — Medication 25 MILLIGRAM(S): at 23:22

## 2024-03-01 RX ADMIN — Medication 15 MILLIGRAM(S): at 23:21

## 2024-03-01 NOTE — ED ADULT TRIAGE NOTE - ARRIVAL INFO ADDITIONAL COMMENTS
Pt hx of migraine. Pt reports last saw neurologist 6 months ago and was recommended "migraine pills" (unrecalled name) however reports medication not working and was recommended to come back March 13 for "migraine injections."

## 2024-03-01 NOTE — ED ADULT TRIAGE NOTE - CHIEF COMPLAINT QUOTE
"Migraine headache for a month. This week I have been dealing with dizzy spells, blurry vision, pressure to the eyes and ears."

## 2024-03-01 NOTE — ED ADULT NURSE NOTE - OBJECTIVE STATEMENT
Pt complaints of HA for a month w/ dizziness for 1 day, blurry vision, pressure to eyes and ears.  Pt is alert and oriented, A&O4, steady gait noted.

## 2024-03-02 VITALS
RESPIRATION RATE: 17 BRPM | OXYGEN SATURATION: 96 % | DIASTOLIC BLOOD PRESSURE: 60 MMHG | SYSTOLIC BLOOD PRESSURE: 93 MMHG | HEART RATE: 90 BPM

## 2024-03-02 LAB
RAPID RVP RESULT: SIGNIFICANT CHANGE UP
SARS-COV-2 RNA SPEC QL NAA+PROBE: SIGNIFICANT CHANGE UP

## 2024-03-02 PROCEDURE — 99284 EMERGENCY DEPT VISIT MOD MDM: CPT | Mod: 25

## 2024-03-02 PROCEDURE — 0225U NFCT DS DNA&RNA 21 SARSCOV2: CPT

## 2024-03-02 PROCEDURE — 70450 CT HEAD/BRAIN W/O DYE: CPT | Mod: MC

## 2024-03-02 PROCEDURE — 96375 TX/PRO/DX INJ NEW DRUG ADDON: CPT

## 2024-03-02 PROCEDURE — 85027 COMPLETE CBC AUTOMATED: CPT

## 2024-03-02 PROCEDURE — 80048 BASIC METABOLIC PNL TOTAL CA: CPT

## 2024-03-02 PROCEDURE — 96374 THER/PROPH/DIAG INJ IV PUSH: CPT

## 2024-03-02 PROCEDURE — 70450 CT HEAD/BRAIN W/O DYE: CPT | Mod: 26,MC

## 2024-03-02 PROCEDURE — 36415 COLL VENOUS BLD VENIPUNCTURE: CPT

## 2024-03-02 PROCEDURE — 84702 CHORIONIC GONADOTROPIN TEST: CPT

## 2024-03-02 NOTE — ED PROVIDER NOTE - CLINICAL SUMMARY MEDICAL DECISION MAKING FREE TEXT BOX
Suspect symptoms likely 2/2 migraine type HA. Similar to prior migraines for patient.  No s/s on meningismus or CNS infection - no neck stiffness/pain, no fever, no AMS  No vision change, normal eye exam, low susp of acute glaucoma  No temporal ttp, low susp of temp arteritis  No red flag signs of SAH/ICH - not thunderclap, no neuro symptoms  No unilateral eye pain, low susp of cluster HA  No trauma  No n/v, bradycardia, or HTN  Will obtain CT head to r/o space occupying lesion  Will treat HA with ketorolac, benadryl, reglan, IVF, and re-assess    Pt feels improved after treatment. Suspect symptoms likely 2/2 migraine type HA. Similar to prior migraines for patient.  No s/s on meningismus or CNS infection - no neck stiffness/pain, no fever, no AMS  No vision change, normal eye exam, low susp of acute glaucoma  No temporal ttp, low susp of temp arteritis  No red flag signs of SAH/ICH - not thunderclap, no neuro symptoms  No unilateral eye pain, low susp of cluster HA  No trauma  No n/v, bradycardia, or HTN  Will obtain CT head to r/o space occupying lesion  Will treat HA with ketorolac, benadryl, reglan, IVF, and re-assess    Pts symptoms are indicative of vertigo, likely peripheral cause. Pt describes episodic, positional vertigo. Low suspicion of central cause of symptoms.  Will obtain labs to r/o electrolyte/metabolic/heme abnormalities, UA to r/o infection, CXR to r/o PNA.  No chest pain, or risk factors in young pt, ACS unlikely.    Treatment with meclizine and IVF was successful in aborting symptoms.    No neck stiffness, photophobia, fever - low suspicion of CNS infection.

## 2024-03-02 NOTE — ED PROVIDER NOTE - OBJECTIVE STATEMENT
39 yo F w PMH of migraine headaches p/w HA for 1 week. Pt states HA is similar to prior HA, although also feels positional vertiginous symptoms which are new. She states the room feels like it is spinning and her ears feel "full" like they will pop. She has not had this in the past. The dizziness is positional, resolves with rest and made worse with head movement. She also has decreased hearing for the past week. Headache not sudden onset, not thunderclap/maximal at onset, not worst HA of life, no associated N/V, no change in vision, no neck stiffness or pain, no fever, no numbness, no weakness, no h/o trauma, no focal neuro symptoms, similar to past headaches.

## 2024-03-02 NOTE — ED PROVIDER NOTE - PHYSICAL EXAMINATION
CONSTITUTIONAL: Non-toxic; in no apparent distress  HEAD: Normocephalic; atraumatic  EYES: PERRL; EOM intact   ENMT: External appears normal  NECK: Supple; non-tender  CARD: Normal S1, S2; no murmurs, rubs, or gallops  RESP: Normal chest excursion with respiration; breath sounds clear and equal bilaterally  ABD: Soft, non-distended; non-tender  EXT: Normal ROM in all four extremities; non-tender to palpation, no peripheral edema  SKIN: Warm, dry, no rash  NEURO: No focal neurological deficiencies, CN 2-12 intact BL, no dysmetria, no pronator drift, gait normal, strength/sensation intact throughout, normal cognition

## 2024-03-02 NOTE — ED PROVIDER NOTE - NSFOLLOWUPINSTRUCTIONS_ED_ALL_ED_FT
Follow up with your primary medical doctor as soon as possible.    Return to the emergency department if your symptoms worsen or if you develop new symptoms.  If you have any problems with followup, please call the ED Referral Coordinator at [phone].      Migraine Headache      A migraine headache is an intense, throbbing pain on one side or both sides of the head. Migraine headaches may also cause other symptoms, such as nausea, vomiting, and sensitivity to light and noise. A migraine headache can last from 4 hours to 3 days. Talk with your doctor about what things may bring on (trigger) your migraine headaches.      What are the causes?    The exact cause of this condition is not known. However, a migraine may be caused when nerves in the brain become irritated and release chemicals that cause inflammation of blood vessels. This inflammation causes pain. This condition may be triggered or caused by:  •Drinking alcohol.      •Smoking.    •Taking medicines, such as:  •Medicine used to treat chest pain (nitroglycerin).      •Birth control pills.      •Estrogen.      •Certain blood pressure medicines.        •Eating or drinking products that contain nitrates, glutamate, aspartame, or tyramine. Aged cheeses, chocolate, or caffeine may also be triggers.      •Doing physical activity.      Other things that may trigger a migraine headache include:  •Menstruation.      •Pregnancy.      •Hunger.      •Stress.      •Lack of sleep or too much sleep.      •Weather changes.      •Fatigue.        What increases the risk?    The following factors may make you more likely to experience migraine headaches:  •Being a certain age. This condition is more common in people who are 25–55 years old.      •Being female.      •Having a family history of migraine headaches.      •Being .      •Having a mental health condition, such as depression or anxiety.      •Being obese.        What are the signs or symptoms?    The main symptom of this condition is pulsating or throbbing pain. This pain may:  •Happen in any area of the head, such as on one side or both sides.      •Interfere with daily activities.      •Get worse with physical activity.      •Get worse with exposure to bright lights or loud noises.      Other symptoms may include:  •Nausea.      •Vomiting.      •Dizziness.      •General sensitivity to bright lights, loud noises, or smells.      Before you get a migraine headache, you may get warning signs (an aura). An aura may include:  •Seeing flashing lights or having blind spots.      •Seeing bright spots, halos, or zigzag lines.      •Having tunnel vision or blurred vision.      •Having numbness or a tingling feeling.      •Having trouble talking.      •Having muscle weakness.      Some people have symptoms after a migraine headache (postdromal phase), such as:  •Feeling tired.      •Difficulty concentrating.        How is this diagnosed?    A migraine headache can be diagnosed based on:  •Your symptoms.      •A physical exam.    •Tests, such as:   •CT scan or an MRI of the head. These imaging tests can help rule out other causes of headaches.      •Taking fluid from the spine (lumbar puncture) and analyzing it (cerebrospinal fluid analysis, or CSF analysis).          How is this treated?    This condition may be treated with medicines that:  •Relieve pain.      •Relieve nausea.      •Prevent migraine headaches.      Treatment for this condition may also include:  •Acupuncture.      •Lifestyle changes like avoiding foods that trigger migraine headaches.      •Biofeedback.      •Cognitive behavioral therapy.        Follow these instructions at home:    Medicines     •Take over-the-counter and prescription medicines only as told by your health care provider.    •Ask your health care provider if the medicine prescribed to you:  •Requires you to avoid driving or using heavy machinery.    •Can cause constipation. You may need to take these actions to prevent or treat constipation:  •Drink enough fluid to keep your urine pale yellow.      •Take over-the-counter or prescription medicines.      •Eat foods that are high in fiber, such as beans, whole grains, and fresh fruits and vegetables.      •Limit foods that are high in fat and processed sugars, such as fried or sweet foods.          Lifestyle     • Do not drink alcohol.      • Do not use any products that contain nicotine or tobacco, such as cigarettes, e-cigarettes, and chewing tobacco. If you need help quitting, ask your health care provider.      •Get at least 8 hours of sleep every night.      •Find ways to manage stress, such as meditation, deep breathing, or yoga.        General instructions                 •Keep a journal to find out what may trigger your migraine headaches. For example, write down:  •What you eat and drink.      •How much sleep you get.      •Any change to your diet or medicines.      •If you have a migraine headache:  •Avoid things that make your symptoms worse, such as bright lights.      •It may help to lie down in a dark, quiet room.      •Do not drive or use heavy machinery.      •Ask your health care provider what activities are safe for you while you are experiencing symptoms.        •Keep all follow-up visits as told by your health care provider. This is important.        Contact a health care provider if:    •You develop symptoms that are different or more severe than your usual migraine headache symptoms.      •You have more than 15 headache days in one month.        Get help right away if:    •Your migraine headache becomes severe.      •Your migraine headache lasts longer than 72 hours.      •You have a fever.      •You have a stiff neck.      •You have vision loss.      •Your muscles feel weak or like you cannot control them.      •You start to lose your balance often.      •You have trouble walking.      •You faint.      •You have a seizure.        Summary    •A migraine headache is an intense, throbbing pain on one side or both sides of the head. Migraines may also cause other symptoms, such as nausea, vomiting, and sensitivity to light and noise.      •This condition may be treated with medicines and lifestyle changes. You may also need to avoid certain things that trigger a migraine headache.      •Keep a journal to find out what may trigger your migraine headaches.      •Contact your health care provider if you have more than 15 headache days in a month or you develop symptoms that are different or more severe than your usual migraine headache symptoms.      This information is not intended to replace advice given to you by your health care provider. Make sure you discuss any questions you have with your health care provider. Follow up with your primary medical doctor as soon as possible.  Follow up with otolaryngology (ear, nose & throat) - to schedule an appointment call [phone]  Return to the emergency department if your symptoms worsen or if you develop new symptoms.  If you have any problems with followup, please call the ED Referral Coordinator at [phone].    Dizziness    Dizziness can manifest as a feeling of unsteadiness or light-headedness. You may feel like you are about to faint. This condition can be caused by a number of things, including medicines, dehydration, or illness. Drink enough fluid to keep your urine clear or pale yellow. Do not drink alcohol and limit your caffeine intake. Avoid quick or sudden movements.  Rise slowly from chairs and steady yourself until you feel okay. In the morning, first sit up on the side of the bed.    SEEK IMMEDIATE MEDICAL CARE IF YOU HAVE ANY OF THE FOLLOWING SYMPTOMS: vomiting, changes in your vision or speech, weakness in your arms or legs, trouble speaking or swallowing, chest pain, abdominal pain, shortness of breath, sweating, bleeding, headache, neck pain, or fever.     Migraine Headache      A migraine headache is an intense, throbbing pain on one side or both sides of the head. Migraine headaches may also cause other symptoms, such as nausea, vomiting, and sensitivity to light and noise. A migraine headache can last from 4 hours to 3 days. Talk with your doctor about what things may bring on (trigger) your migraine headaches.      What are the causes?    The exact cause of this condition is not known. However, a migraine may be caused when nerves in the brain become irritated and release chemicals that cause inflammation of blood vessels. This inflammation causes pain. This condition may be triggered or caused by:  •Drinking alcohol.      •Smoking.    •Taking medicines, such as:  •Medicine used to treat chest pain (nitroglycerin).      •Birth control pills.      •Estrogen.      •Certain blood pressure medicines.        •Eating or drinking products that contain nitrates, glutamate, aspartame, or tyramine. Aged cheeses, chocolate, or caffeine may also be triggers.      •Doing physical activity.      Other things that may trigger a migraine headache include:  •Menstruation.      •Pregnancy.      •Hunger.      •Stress.      •Lack of sleep or too much sleep.      •Weather changes.      •Fatigue.        What increases the risk?    The following factors may make you more likely to experience migraine headaches:  •Being a certain age. This condition is more common in people who are 25–55 years old.      •Being female.      •Having a family history of migraine headaches.      •Being .      •Having a mental health condition, such as depression or anxiety.      •Being obese.        What are the signs or symptoms?    The main symptom of this condition is pulsating or throbbing pain. This pain may:  •Happen in any area of the head, such as on one side or both sides.      •Interfere with daily activities.      •Get worse with physical activity.      •Get worse with exposure to bright lights or loud noises.      Other symptoms may include:  •Nausea.      •Vomiting.      •Dizziness.      •General sensitivity to bright lights, loud noises, or smells.      Before you get a migraine headache, you may get warning signs (an aura). An aura may include:  •Seeing flashing lights or having blind spots.      •Seeing bright spots, halos, or zigzag lines.      •Having tunnel vision or blurred vision.      •Having numbness or a tingling feeling.      •Having trouble talking.      •Having muscle weakness.      Some people have symptoms after a migraine headache (postdromal phase), such as:  •Feeling tired.      •Difficulty concentrating.        How is this diagnosed?    A migraine headache can be diagnosed based on:  •Your symptoms.      •A physical exam.    •Tests, such as:   •CT scan or an MRI of the head. These imaging tests can help rule out other causes of headaches.      •Taking fluid from the spine (lumbar puncture) and analyzing it (cerebrospinal fluid analysis, or CSF analysis).          How is this treated?    This condition may be treated with medicines that:  •Relieve pain.      •Relieve nausea.      •Prevent migraine headaches.      Treatment for this condition may also include:  •Acupuncture.      •Lifestyle changes like avoiding foods that trigger migraine headaches.      •Biofeedback.      •Cognitive behavioral therapy.        Follow these instructions at home:    Medicines     •Take over-the-counter and prescription medicines only as told by your health care provider.    •Ask your health care provider if the medicine prescribed to you:  •Requires you to avoid driving or using heavy machinery.    •Can cause constipation. You may need to take these actions to prevent or treat constipation:  •Drink enough fluid to keep your urine pale yellow.      •Take over-the-counter or prescription medicines.      •Eat foods that are high in fiber, such as beans, whole grains, and fresh fruits and vegetables.      •Limit foods that are high in fat and processed sugars, such as fried or sweet foods.          Lifestyle     • Do not drink alcohol.      • Do not use any products that contain nicotine or tobacco, such as cigarettes, e-cigarettes, and chewing tobacco. If you need help quitting, ask your health care provider.      •Get at least 8 hours of sleep every night.      •Find ways to manage stress, such as meditation, deep breathing, or yoga.        General instructions                 •Keep a journal to find out what may trigger your migraine headaches. For example, write down:  •What you eat and drink.      •How much sleep you get.      •Any change to your diet or medicines.      •If you have a migraine headache:  •Avoid things that make your symptoms worse, such as bright lights.      •It may help to lie down in a dark, quiet room.      •Do not drive or use heavy machinery.      •Ask your health care provider what activities are safe for you while you are experiencing symptoms.        •Keep all follow-up visits as told by your health care provider. This is important.        Contact a health care provider if:    •You develop symptoms that are different or more severe than your usual migraine headache symptoms.      •You have more than 15 headache days in one month.        Get help right away if:    •Your migraine headache becomes severe.      •Your migraine headache lasts longer than 72 hours.      •You have a fever.      •You have a stiff neck.      •You have vision loss.      •Your muscles feel weak or like you cannot control them.      •You start to lose your balance often.      •You have trouble walking.      •You faint.      •You have a seizure.        Summary    •A migraine headache is an intense, throbbing pain on one side or both sides of the head. Migraines may also cause other symptoms, such as nausea, vomiting, and sensitivity to light and noise.      •This condition may be treated with medicines and lifestyle changes. You may also need to avoid certain things that trigger a migraine headache.      •Keep a journal to find out what may trigger your migraine headaches.      •Contact your health care provider if you have more than 15 headache days in a month or you develop symptoms that are different or more severe than your usual migraine headache symptoms.      This information is not intended to replace advice given to you by your health care provider. Make sure you discuss any questions you have with your health care provider.

## 2024-03-02 NOTE — ED PROVIDER NOTE - NS ED ROS FT
CONSTITUTIONAL: No fever, no chills, no fatigue  EYES: No eye redness, no visual changes  ENT: No ear pain, no sore throat  CARDIOVASCULAR: No chest pain, no palpitations  RESPIRATORY: No cough, no SOB  GI: No abdominal pain, no nausea, no vomiting, no constipation, no diarrhea  GENITOURINARY: No dysuria, no frequency, no hematuria  MUSCULOSKELETAL: No back pain, no joint pain, no myalgias  SKIN: No rash, no peripheral edema  NEURO: +headache, no confusion, +dizziness    ALL OTHER SYSTEMS NEGATIVE.

## 2024-03-02 NOTE — ED PROVIDER NOTE - PATIENT PORTAL LINK FT
You can access the FollowMyHealth Patient Portal offered by [facility] by registering at the following website: [email or web address]. By joining [facility]’s FollowMyHealth portal, you will also be able to view your health information using other applications (apps) compatible with our system.

## 2025-07-08 NOTE — ED ADULT NURSE NOTE - NSHOSCREENINGQ1_ED_ALL_ED
[facility]    [address], [address]  [place], [place], [facility] [place]  P: [phone]  F: [phone]      
No